# Patient Record
Sex: FEMALE | Race: WHITE | NOT HISPANIC OR LATINO | Employment: UNEMPLOYED | URBAN - METROPOLITAN AREA
[De-identification: names, ages, dates, MRNs, and addresses within clinical notes are randomized per-mention and may not be internally consistent; named-entity substitution may affect disease eponyms.]

---

## 2017-01-09 ENCOUNTER — PATIENT MESSAGE (OUTPATIENT)
Dept: NEUROLOGY | Facility: CLINIC | Age: 32
End: 2017-01-09

## 2017-04-04 ENCOUNTER — OFFICE VISIT (OUTPATIENT)
Dept: NEUROLOGY | Facility: CLINIC | Age: 32
End: 2017-04-04
Payer: COMMERCIAL

## 2017-04-04 VITALS
DIASTOLIC BLOOD PRESSURE: 70 MMHG | HEART RATE: 80 BPM | HEIGHT: 61 IN | RESPIRATION RATE: 16 BRPM | WEIGHT: 172.19 LBS | SYSTOLIC BLOOD PRESSURE: 108 MMHG | BODY MASS INDEX: 32.51 KG/M2

## 2017-04-04 DIAGNOSIS — G93.2 PSEUDOTUMOR CEREBRI: Primary | ICD-10-CM

## 2017-04-04 DIAGNOSIS — E66.9 OBESITY (BMI 30-39.9): ICD-10-CM

## 2017-04-04 DIAGNOSIS — G89.29 CHRONIC TMJ PAIN: ICD-10-CM

## 2017-04-04 DIAGNOSIS — M26.629 CHRONIC TMJ PAIN: ICD-10-CM

## 2017-04-04 PROCEDURE — 99214 OFFICE O/P EST MOD 30 MIN: CPT | Mod: S$GLB,,, | Performed by: PSYCHIATRY & NEUROLOGY

## 2017-04-04 PROCEDURE — 1160F RVW MEDS BY RX/DR IN RCRD: CPT | Mod: S$GLB,,, | Performed by: PSYCHIATRY & NEUROLOGY

## 2017-04-04 PROCEDURE — 99999 PR PBB SHADOW E&M-EST. PATIENT-LVL II: CPT | Mod: PBBFAC,,, | Performed by: PSYCHIATRY & NEUROLOGY

## 2017-04-04 RX ORDER — ESOMEPRAZOLE MAGNESIUM 40 MG/1
40 CAPSULE, DELAYED RELEASE ORAL DAILY
Refills: 5 | COMMUNITY
Start: 2017-03-20

## 2017-04-04 NOTE — PROGRESS NOTES
HPI: Dina Duran is a 31 y.o. female with pseudotumor cerebri diagnosed in 7/2016 after visual exam (due to blurred vision) and LP results.   Her eye MD sent records from 2/2017- papilledema is improved  Rare if any visual changes. No continued blurred vision  No consistent headaches. Some sinus pressure  She has lost 5 pounds since the last visit.   She saw an ENT about ear and jaw pain. She is having this pain in the ear and her right jaw with some radiation down. She has some days without much pain and others with some pain. She has been to the dentist and was not thought to have bruxism. She was recommended to see oral surgeon    Review of Systems   Constitutional: Negative for fever.   Eyes: Negative for blurred vision and double vision.   Respiratory: Negative for hemoptysis.    Cardiovascular: Negative for leg swelling.   Gastrointestinal: Negative for blood in stool.   Genitourinary: Negative for hematuria.   Musculoskeletal: Negative for falls.   Skin: Negative for rash.   Neurological: Negative for seizures and headaches.   Psychiatric/Behavioral: The patient does not have insomnia.          I have reviewed all of this patient's past medical and surgical histories as well as family and social histories and active allergies and medications as documented in the electronic medical record.            Exam:  Gen Appearance, well developed/nourished in no apparent distress  CV: 2+ distal pulses with no edema or swelling  Neuro:  MS: Awake, alert, . Sustains attention. Recent/remote memory intact, Language is full to spontaneous speech/comprehension. Fund of Knowledge is full  CN: Optic discs are normal  without venous pulsations today, PERRL, Extraoccular movements and visual fields are full. Normal facial sensation and strength, Hearing symmetric, Tongue and Palate are midline and strong. Shoulder Shrug symmetric and strong.  Motor: Normal bulk, tone, no abnormal movements. 5/5 strength bilateral  upper/lower extremities with 2+ reflexes no clonus  Sensory: symmetric  temp, and vibration. Romberg negative  Cerebellar: Finger-nose,Heal-shin, Rapid alternating movements intact  Gait: Normal stance, no ataxia    MRI/A/V brain 2016- unremarkable    Labs: CMP unremarkable/ as expected for diamox use 2016    Assessment/Plan: Dina Duran is a 31 y.o. female with pseudotumor cerebri diagnosed in 7/2016 after visual exam (due to blurred vision) and LP results.     I recommend:     1. Her visual symptoms are resolved and she has no headaches- papilledema  Is improving.   2. She is now tolerating Diamox 250mg BID - continue this dose as papilledema is improving. Opthalmology considering dose reduction if she continues to improve.   3. She will continue opthalmology follow up   4. Reviewed ultimate treatment of pseudotumor cerebri is weight loss-- she continues to reduce obesity  5. Agree that she may have some symptoms of TMJ- see oral surgery for another opinion. Consider .   RTC in 6 months. Notify me sooner for any worsening vision or headaches.

## 2017-04-04 NOTE — MR AVS SNAPSHOT
[facility]  [address] [address]  Phone: [phone]  Fax: [phone]                  Dina Rodrigezescobar   2017 4:30 PM   Office Visit    Description:  Female : 1985   Provider:  Cody Rivera MD   Department:  [facility]           Reason for Visit     Neurologic Problem           Diagnoses this Visit        Comments    Pseudotumor cerebri    -  Primary     Chronic TMJ pain         Obesity (BMI 30-39.9)                To Do List           Goals (5 Years of Data)     None      Follow-Up and Disposition     Return in about 6 months (around 10/4/2017).      [facility]     [facility] On [facility] Nurse Care Line -  Assistance  Unless otherwise directed by your provider, please contact [facility], our nurse care line that is available for  assistance.     Registered nurses in the [facility] provide: appointment scheduling, clinical advisement, health education, and other advisory services.  Call: [phone] (toll free)               Medications           Message regarding Medications     Verify the changes and/or additions to your medication regime listed below are the same as discussed with your clinician today.  If any of these changes or additions are incorrect, please notify your healthcare provider.             Verify that the below list of medications is an accurate representation of the medications you are currently taking.  If none reported, the list may be blank. If incorrect, please contact your healthcare provider. Carry this list with you in case of emergency.           Current Medications     acetaZOLAMIDE (DIAMOX) 250 MG tablet Take 1 tablet (250 mg total) by mouth 2 (two) times daily.    cetirizine (ZYRTEC) 10 MG tablet Take 10 mg by mouth once daily.    esomeprazole (NEXIUM) 40 MG capsule TK 1 C PO QD    metformin (GLUCOPHAGE) 500 MG tablet Take 1 tablet by mouth 2 (two) times daily.          "  Clinical Reference Information           Your Vitals Were     BP Pulse Resp Height Weight BMI    108/70 (BP Location: Right arm, Patient Position: Sitting, BP Method: Manual) 80 16 5' 1" (1.549 m) 78.1 kg (172 lb 2.9 oz) 32.53 kg/m2      Blood Pressure          Most Recent Value    BP  108/70      Allergies as of 4/4/2017     Pcn [Penicillins]    Prilosec [Omeprazole Magnesium]      Immunizations Administered on Date of Encounter - 4/4/2017     None      Language Assistance Services     ATTENTION: Language assistance services are available, free of charge. Please call [phone].      ATENCIÓN: Si habla español, tiene a russ disposición servicios gratuitos de asistencia lingüística. Llame al [phone].     CHÚ Ý: N?u b?n nói Ti?ng Vi?t, có các d?ch v? h? tr? ngôn ng? mi?n phí dành cho b?n. G?i s? [phone].         [facility] complies with applicable Federal civil rights laws and does not discriminate on the basis of race, color, national origin, age, disability, or sex.        "

## 2017-06-07 ENCOUNTER — PATIENT MESSAGE (OUTPATIENT)
Dept: NEUROLOGY | Facility: CLINIC | Age: 32
End: 2017-06-07

## 2017-10-02 ENCOUNTER — OFFICE VISIT (OUTPATIENT)
Dept: NEUROLOGY | Facility: CLINIC | Age: 32
End: 2017-10-02
Payer: COMMERCIAL

## 2017-10-02 VITALS
HEART RATE: 86 BPM | DIASTOLIC BLOOD PRESSURE: 66 MMHG | WEIGHT: 170.44 LBS | SYSTOLIC BLOOD PRESSURE: 98 MMHG | HEIGHT: 61 IN | BODY MASS INDEX: 32.18 KG/M2 | RESPIRATION RATE: 14 BRPM

## 2017-10-02 DIAGNOSIS — L29.9 ITCHING: ICD-10-CM

## 2017-10-02 DIAGNOSIS — G93.2 PSEUDOTUMOR CEREBRI: Primary | ICD-10-CM

## 2017-10-02 DIAGNOSIS — E66.9 OBESITY (BMI 30-39.9): ICD-10-CM

## 2017-10-02 PROCEDURE — 99214 OFFICE O/P EST MOD 30 MIN: CPT | Mod: S$GLB,,, | Performed by: PSYCHIATRY & NEUROLOGY

## 2017-10-02 PROCEDURE — 3008F BODY MASS INDEX DOCD: CPT | Mod: S$GLB,,, | Performed by: PSYCHIATRY & NEUROLOGY

## 2017-10-02 PROCEDURE — 99999 PR PBB SHADOW E&M-EST. PATIENT-LVL II: CPT | Mod: PBBFAC,,, | Performed by: PSYCHIATRY & NEUROLOGY

## 2017-10-02 RX ORDER — CHOLECALCIFEROL (VITAMIN D3) 25 MCG
1000 TABLET ORAL DAILY
COMMUNITY

## 2017-10-02 RX ORDER — ACETAZOLAMIDE 250 MG/1
250 TABLET ORAL 2 TIMES DAILY
Qty: 60 TABLET | Refills: 11 | Status: SHIPPED | OUTPATIENT
Start: 2017-10-02 | End: 2018-10-16 | Stop reason: SDUPTHER

## 2017-10-02 RX ORDER — VITAMIN E 268 MG
400 CAPSULE ORAL 2 TIMES DAILY
COMMUNITY

## 2017-10-02 NOTE — PROGRESS NOTES
HPI:  Dina Duran is a 32 y.o. female with pseudotumor cerebri diagnosed in 7/2016 after visual exam (due to blurred vision) and LP results.  Patient did e-mail me in June with 2 weeks of headaches- reduction of analgesic overuse suggested. This did resolve  Her opthalmologist lowered her diamox dose in July due to improved exam to 250mg daily. She has follow up with this doc in December. She is having a  increased headaches since then. She can have 2-4 headaches per week which are not severe and she can treat with rest or OTC symptoms. No visual symptoms as prior. She has been having more forgetfulness lately, over the past month with her daily tasks. Functioning well, working, taking care of her family. Just short term difficulty with memory.   TMJ symptoms are improved with changes in diet and reducing gum chewing.   She reports some redness and itching/dryness of the lips over the past 2 weeks- started with numbness. No changes in diet or lipstick or topical agents.   Saw PCP for fatigue recently- had labs.   She is still loosing weight.    Review of Systems   Constitutional: Negative for fever.   HENT: Negative for nosebleeds.    Eyes: Negative for blurred vision and double vision.        But sometimes seeing shadows.    Respiratory: Negative for hemoptysis.    Cardiovascular: Negative for leg swelling.   Gastrointestinal: Negative for blood in stool.   Genitourinary: Negative for hematuria.   Musculoskeletal: Negative for falls.   Skin: Negative for rash.   Neurological: Positive for headaches. Negative for sensory change.   Psychiatric/Behavioral: The patient has insomnia.          I have reviewed all of this patient's past medical and surgical histories as well as family and social histories and active allergies and medications as documented in the electronic medical record.      Exam:  Gen Appearance, well developed/nourished in no apparent distress  CV: 2+ distal pulses with no edema or  swelling  Neuro:  MS: Awake, alert, . Sustains attention. Recent/remote memory intact, Language is full to spontaneous speech/comprehension. Fund of Knowledge is full  CN: Optic discs are normal  without venous pulsations today, PERRL, Extraoccular movements and visual fields are full. Normal facial sensation and strength, Hearing symmetric, Tongue and Palate are midline and strong. Shoulder Shrug symmetric and strong.  Motor: Normal bulk, tone, no abnormal movements. 5/5 strength bilateral upper/lower extremities with 2+ reflexes no clonus  Sensory: symmetric  temp, and vibration. Romberg negative  Cerebellar: Finger-nose, Rapid alternating movements intact  Gait: Normal stance, no ataxia    MRI/A/V brain 2016- unremarkable    Labs: CMP unremarkable/ as expected for diamox use 2016    Assessment/Plan: Dina Duran is a 32 y.o. female with pseudotumor cerebri diagnosed in 7/2016 after visual exam (due to blurred vision) and LP results.     I recommend:     1. Concerning is her increased cluster of symptoms including more frequent headaches (fatigue, memory challenges) with lowering of Diamox recently by ophthalmologist. Raise dose back to  Diamox 250mg BID unless side effects.   2. Will monitor CMP at next visit  3. Reviewed ultimate treatment of pseudotumor cerebri is weight loss-- she continues to reduce obesity  4. Use OTC cortisone cream BID for a week for itching lips. However, numbness if present could be related to pseudotumor. See PCP or derm if not better soon.   RTC in 3 months

## 2017-10-19 ENCOUNTER — PATIENT MESSAGE (OUTPATIENT)
Dept: NEUROLOGY | Facility: CLINIC | Age: 32
End: 2017-10-19

## 2017-10-19 RX ORDER — BUTALBITAL, ACETAMINOPHEN AND CAFFEINE 50; 325; 40 MG/1; MG/1; MG/1
TABLET ORAL
Qty: 20 TABLET | Refills: 1 | Status: SHIPPED | OUTPATIENT
Start: 2017-10-19 | End: 2019-07-08

## 2017-10-29 ENCOUNTER — PATIENT MESSAGE (OUTPATIENT)
Dept: NEUROLOGY | Facility: CLINIC | Age: 32
End: 2017-10-29

## 2017-11-30 ENCOUNTER — PATIENT MESSAGE (OUTPATIENT)
Dept: NEUROLOGY | Facility: CLINIC | Age: 32
End: 2017-11-30

## 2017-12-09 ENCOUNTER — PATIENT MESSAGE (OUTPATIENT)
Dept: NEUROLOGY | Facility: CLINIC | Age: 32
End: 2017-12-09

## 2017-12-11 DIAGNOSIS — G93.2 PSEUDOTUMOR CEREBRI: Primary | ICD-10-CM

## 2017-12-14 ENCOUNTER — PATIENT MESSAGE (OUTPATIENT)
Dept: NEUROLOGY | Facility: CLINIC | Age: 32
End: 2017-12-14

## 2018-02-14 ENCOUNTER — LAB VISIT (OUTPATIENT)
Dept: LAB | Facility: HOSPITAL | Age: 33
End: 2018-02-14
Attending: NURSE PRACTITIONER
Payer: COMMERCIAL

## 2018-02-14 ENCOUNTER — OFFICE VISIT (OUTPATIENT)
Dept: NEUROLOGY | Facility: CLINIC | Age: 33
End: 2018-02-14
Payer: COMMERCIAL

## 2018-02-14 VITALS
RESPIRATION RATE: 16 BRPM | WEIGHT: 168.44 LBS | HEIGHT: 61 IN | SYSTOLIC BLOOD PRESSURE: 100 MMHG | HEART RATE: 80 BPM | BODY MASS INDEX: 31.8 KG/M2 | DIASTOLIC BLOOD PRESSURE: 76 MMHG

## 2018-02-14 DIAGNOSIS — G93.2 PSEUDOTUMOR CEREBRI: ICD-10-CM

## 2018-02-14 DIAGNOSIS — R20.0 NUMBNESS OF TOES: ICD-10-CM

## 2018-02-14 DIAGNOSIS — R41.89 COGNITIVE IMPAIRMENT: ICD-10-CM

## 2018-02-14 DIAGNOSIS — G43.101 MIGRAINE WITH AURA AND WITH STATUS MIGRAINOSUS, NOT INTRACTABLE: Primary | ICD-10-CM

## 2018-02-14 DIAGNOSIS — F41.9 ANXIETY: ICD-10-CM

## 2018-02-14 PROBLEM — G43.109 MIGRAINE WITH AURA: Status: ACTIVE | Noted: 2018-02-14

## 2018-02-14 LAB
ALBUMIN SERPL BCP-MCNC: 3.8 G/DL
ALP SERPL-CCNC: 44 U/L
ALT SERPL W/O P-5'-P-CCNC: 11 U/L
ANION GAP SERPL CALC-SCNC: 7 MMOL/L
AST SERPL-CCNC: 12 U/L
BILIRUB SERPL-MCNC: 0.5 MG/DL
BUN SERPL-MCNC: 18 MG/DL
CALCIUM SERPL-MCNC: 8.9 MG/DL
CHLORIDE SERPL-SCNC: 111 MMOL/L
CO2 SERPL-SCNC: 22 MMOL/L
CREAT SERPL-MCNC: 0.7 MG/DL
EST. GFR  (AFRICAN AMERICAN): >60 ML/MIN/1.73 M^2
EST. GFR  (NON AFRICAN AMERICAN): >60 ML/MIN/1.73 M^2
GLUCOSE SERPL-MCNC: 85 MG/DL
POTASSIUM SERPL-SCNC: 3.8 MMOL/L
PROT SERPL-MCNC: 6.7 G/DL
SODIUM SERPL-SCNC: 140 MMOL/L

## 2018-02-14 PROCEDURE — 36415 COLL VENOUS BLD VENIPUNCTURE: CPT

## 2018-02-14 PROCEDURE — 99999 PR PBB SHADOW E&M-EST. PATIENT-LVL IV: CPT | Mod: PBBFAC,,, | Performed by: NURSE PRACTITIONER

## 2018-02-14 PROCEDURE — 80053 COMPREHEN METABOLIC PANEL: CPT

## 2018-02-14 PROCEDURE — 99214 OFFICE O/P EST MOD 30 MIN: CPT | Mod: S$GLB,,, | Performed by: NURSE PRACTITIONER

## 2018-02-14 PROCEDURE — 3008F BODY MASS INDEX DOCD: CPT | Mod: S$GLB,,, | Performed by: NURSE PRACTITIONER

## 2018-02-14 RX ORDER — INDOMETHACIN 50 MG/1
50 CAPSULE ORAL 2 TIMES DAILY PRN
Qty: 30 CAPSULE | Refills: 0 | Status: SHIPPED | OUTPATIENT
Start: 2018-02-14 | End: 2018-03-01 | Stop reason: SDUPTHER

## 2018-02-14 RX ORDER — ZONISAMIDE 25 MG/1
25 CAPSULE ORAL NIGHTLY
Qty: 30 CAPSULE | Refills: 11 | Status: SHIPPED | OUTPATIENT
Start: 2018-02-14 | End: 2018-02-14 | Stop reason: SDUPTHER

## 2018-02-14 RX ORDER — ACETAMINOPHEN 500 MG
1000 TABLET ORAL EVERY 6 HOURS PRN
COMMUNITY

## 2018-02-14 RX ORDER — ZONISAMIDE 25 MG/1
25 CAPSULE ORAL NIGHTLY
Qty: 120 CAPSULE | Refills: 11 | Status: SHIPPED | OUTPATIENT
Start: 2018-02-14 | End: 2018-06-05

## 2018-02-14 RX ORDER — IBUPROFEN 200 MG
600 TABLET ORAL EVERY 6 HOURS PRN
COMMUNITY

## 2018-02-15 ENCOUNTER — PATIENT MESSAGE (OUTPATIENT)
Dept: NEUROLOGY | Facility: CLINIC | Age: 33
End: 2018-02-15

## 2018-03-01 DIAGNOSIS — G43.101 MIGRAINE WITH AURA AND WITH STATUS MIGRAINOSUS, NOT INTRACTABLE: ICD-10-CM

## 2018-03-01 RX ORDER — INDOMETHACIN 50 MG/1
CAPSULE ORAL
Qty: 30 CAPSULE | Refills: 0 | Status: SHIPPED | OUTPATIENT
Start: 2018-03-01 | End: 2018-06-05

## 2018-06-05 ENCOUNTER — OFFICE VISIT (OUTPATIENT)
Dept: NEUROLOGY | Facility: CLINIC | Age: 33
End: 2018-06-05
Payer: COMMERCIAL

## 2018-06-05 VITALS
WEIGHT: 173.5 LBS | RESPIRATION RATE: 16 BRPM | HEART RATE: 78 BPM | DIASTOLIC BLOOD PRESSURE: 68 MMHG | SYSTOLIC BLOOD PRESSURE: 104 MMHG | BODY MASS INDEX: 32.76 KG/M2 | HEIGHT: 61 IN

## 2018-06-05 DIAGNOSIS — G93.2 PSEUDOTUMOR CEREBRI: ICD-10-CM

## 2018-06-05 DIAGNOSIS — E66.9 OBESITY (BMI 30-39.9): ICD-10-CM

## 2018-06-05 DIAGNOSIS — G43.101 MIGRAINE WITH AURA AND WITH STATUS MIGRAINOSUS, NOT INTRACTABLE: Primary | ICD-10-CM

## 2018-06-05 PROCEDURE — 99999 PR PBB SHADOW E&M-EST. PATIENT-LVL III: CPT | Mod: PBBFAC,,, | Performed by: PSYCHIATRY & NEUROLOGY

## 2018-06-05 PROCEDURE — 99214 OFFICE O/P EST MOD 30 MIN: CPT | Mod: S$GLB,,, | Performed by: PSYCHIATRY & NEUROLOGY

## 2018-06-05 PROCEDURE — 3008F BODY MASS INDEX DOCD: CPT | Mod: CPTII,S$GLB,, | Performed by: PSYCHIATRY & NEUROLOGY

## 2018-06-05 NOTE — PROGRESS NOTES
HPI: Dina Duran is a 32 y.o. female with pseudotumor cerebri diagnosed in 7/2016 after visual exam (due to blurred vision) and LP results.     Since the last visit with me, patient saw NP, Grace due to increased headaches  She also complained of some toe numbness  Patient also had less papilledema by eye exam late last year  Zonegran was never needed.  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   No more dizziness spells since this.  She only has headache with menses now  She no longer has tingling  She has gained 5 pounds since the last visit.   Currently taking diamox 250mg at 1/2 int he am and 1 at night.   Her last eye exam this year showed return to normal- she was discharged for 6 months.     Review of Systems   Constitutional: Negative for fever.   HENT: Negative for nosebleeds.    Eyes: Negative for blurred vision and double vision.            Respiratory: Negative for hemoptysis.    Cardiovascular: Negative for leg swelling.   Gastrointestinal: Negative for blood in stool.   Genitourinary: Negative for hematuria.   Musculoskeletal: Negative for falls.   Skin: Negative for rash.   Neurological: Positive for headaches.   Psychiatric/Behavioral: The patient does not have insomnia.          I have reviewed all of this patient's past medical and surgical histories as well as family and social histories and active allergies and medications as documented in the electronic medical record.      Exam:  Gen Appearance, well developed/nourished in no apparent distress  CV: 2+ distal pulses with no edema or swelling  Neuro:  MS: Awake, alert, . Sustains attention. Recent/remote memory intact, Language is full to spontaneous speech/comprehension. Fund of Knowledge is full  CN: Optic discs are normal  without venous pulsations today, PERRL, Extraoccular movements and visual fields are full. Normal facial sensation and  strength, Hearing symmetric, Tongue and Palate are midline and strong. Shoulder Shrug symmetric and strong.  Motor: Normal bulk, tone, no abnormal movements. 5/5 strength bilateral upper/lower extremities with 2+ reflexes no clonus  Sensory: symmetric  temp, and vibration. Romberg negative  Cerebellar: Finger-nose, Rapid alternating movements intact  Gait: Normal stance, no ataxia    MRI/A/V brain 2016- unremarkable    Labs: CMP  2/2018 reviewed    Assessment/Plan: Dina Duran is a 32 y.o. female with pseudotumor cerebri diagnosed in 7/2016 after visual exam (due to blurred vision) and LP results.     I recommend:   Grace:   1. Zonegran not needed as she is improved greatly with headache since removing the stress of her job.   2. Check CMP  3. Continue Diamox at 125mg in the am and 250mg at night  4. Reviewed ultimate treatment of pseudotumor cerebri is weight loss/reduction of obesity-- she will return to diet and exercise  5. Can use Fioricet PRN vs Indocin PRN headce     RTC 6 months

## 2018-09-17 ENCOUNTER — OFFICE VISIT (OUTPATIENT)
Dept: NEUROLOGY | Facility: CLINIC | Age: 33
End: 2018-09-17
Payer: COMMERCIAL

## 2018-09-17 VITALS
HEART RATE: 86 BPM | SYSTOLIC BLOOD PRESSURE: 100 MMHG | HEIGHT: 61 IN | RESPIRATION RATE: 14 BRPM | WEIGHT: 178.38 LBS | BODY MASS INDEX: 33.68 KG/M2 | DIASTOLIC BLOOD PRESSURE: 86 MMHG

## 2018-09-17 DIAGNOSIS — G43.101 MIGRAINE WITH AURA AND WITH STATUS MIGRAINOSUS, NOT INTRACTABLE: ICD-10-CM

## 2018-09-17 DIAGNOSIS — G93.2 PSEUDOTUMOR CEREBRI: Primary | ICD-10-CM

## 2018-09-17 DIAGNOSIS — E66.9 OBESITY (BMI 30-39.9): ICD-10-CM

## 2018-09-17 PROCEDURE — 99999 PR PBB SHADOW E&M-EST. PATIENT-LVL III: CPT | Mod: PBBFAC,,, | Performed by: PSYCHIATRY & NEUROLOGY

## 2018-09-17 PROCEDURE — 99214 OFFICE O/P EST MOD 30 MIN: CPT | Mod: S$GLB,,, | Performed by: PSYCHIATRY & NEUROLOGY

## 2018-09-17 PROCEDURE — 3008F BODY MASS INDEX DOCD: CPT | Mod: CPTII,S$GLB,, | Performed by: PSYCHIATRY & NEUROLOGY

## 2018-09-17 RX ORDER — ONDANSETRON 4 MG/1
8 TABLET, FILM COATED ORAL EVERY 8 HOURS PRN
COMMUNITY

## 2018-09-17 NOTE — PROGRESS NOTES
HPI: Dina Duran is a 33 y.o. female with pseudotumor cerebri diagnosed in 7/2016 after visual exam (due to blurred vision) and LP results.     Patient returns sooner than her scheduled visit complaining of worsening symptoms.  She states she has had dizziness, headaches and pupillary dilation on the right once mildly. She states this has occurred a few time briefly over the years. She feels she had pupillary changes when diagnosed 2 years ago.   Symptoms have started over several weeks and feels worse lately.  She has pulsatile tinnitus. She has nausea and headaches. She also complains of dizziness of room movement.   Vision is poor on the right side, she states poor focus. The symptom have been daily.   She saw her eye doc (optometrist) and was told visual acuity was normal and she was told her papilledema was not active in the past 1 month.   Her  is here with her today.     She states she has not taken zonegran- never did take  She is currently taking 125mg of Diamox in the am and 250mg at night.   She is not currently working.   Gained 5 poundsince the last visit    Review of Systems   Constitutional: Negative for fever.   HENT: Positive for tinnitus. Negative for nosebleeds.    Eyes: Positive for blurred vision. Negative for double vision.            Respiratory: Negative for hemoptysis.    Cardiovascular: Negative for leg swelling.   Gastrointestinal: Negative for blood in stool.   Genitourinary: Negative for hematuria.   Musculoskeletal: Negative for falls.   Skin: Negative for rash.   Neurological: Positive for dizziness and headaches. Negative for seizures.   Psychiatric/Behavioral: The patient does not have insomnia.          I have reviewed all of this patient's past medical and surgical histories as well as family and social histories and active allergies and medications as documented in the electronic medical record.      Exam:  Gen Appearance, well developed/nourished in no apparent  distress  CV: 2+ distal pulses with no edema or swelling  Neuro:  MS: Awake, alert, . Sustains attention. Recent/remote memory intact, Language is full to spontaneous speech/comprehension. Fund of Knowledge is full  CN: Optic discs are mildly raised on both sides without venous pulsations  PERRL, Extraoccular movements and visual fields are full. Normal facial sensation and strength, Hearing symmetric, Tongue and Palate are midline and strong. Shoulder Shrug symmetric and strong.  Motor: Normal bulk, tone, no abnormal movements. 5/5 strength bilateral upper/lower extremities with 2+ reflexes no clonus  Sensory: symmetric  temp, and vibration. Romberg negative  Cerebellar: Finger-nose, Rapid alternating movements intact  Gait: Normal stance, no ataxia    MRI/A/V brain 2016- unremarkable    Labs: CMP  2/2018 reviewed    Assessment/Plan: Dina Duran is a 33 y.o. female with pseudotumor cerebri diagnosed in 7/2016 after visual exam (due to blurred vision) and LP results.     I recommend:   1. Patient did note right sided pupillary changes at onset of symptoms 2 years ago and has vague changes at times now. MRI/MRA and MRV were normal at presentation- no further work up needed as long as she improves again as expected  2. Currently, she is having more nausea, headaches, pulsatile tinnitus and dizziness as well in light of mild increase in papilledema today  3. Raise Diamox to 250mg BID (introduce the dose ever other day for 2-3 weeks)  4. CMP at the next visit  5. Zonegran was presribed prior but she was too afraid to take  6. Reviewed ultimate treatment of pseudotumor cerebri is weight loss/reduction of obesity-- she will return to diet and exercise  7. Can use Fioricet PRN vs Indocin PRN headce     RTC as scheduled

## 2018-09-18 ENCOUNTER — PATIENT MESSAGE (OUTPATIENT)
Dept: NEUROLOGY | Facility: CLINIC | Age: 33
End: 2018-09-18

## 2018-10-16 RX ORDER — ACETAZOLAMIDE 250 MG/1
250 TABLET ORAL 2 TIMES DAILY
Qty: 60 TABLET | Refills: 11 | Status: SHIPPED | OUTPATIENT
Start: 2018-10-16 | End: 2019-09-16 | Stop reason: SDUPTHER

## 2018-11-25 ENCOUNTER — PATIENT MESSAGE (OUTPATIENT)
Dept: NEUROLOGY | Facility: CLINIC | Age: 33
End: 2018-11-25

## 2018-12-01 ENCOUNTER — PATIENT MESSAGE (OUTPATIENT)
Dept: NEUROLOGY | Facility: CLINIC | Age: 33
End: 2018-12-01

## 2019-01-07 ENCOUNTER — OFFICE VISIT (OUTPATIENT)
Dept: NEUROLOGY | Facility: CLINIC | Age: 34
End: 2019-01-07
Payer: COMMERCIAL

## 2019-01-07 ENCOUNTER — LAB VISIT (OUTPATIENT)
Dept: LAB | Facility: HOSPITAL | Age: 34
End: 2019-01-07
Attending: PSYCHIATRY & NEUROLOGY
Payer: COMMERCIAL

## 2019-01-07 VITALS
HEART RATE: 88 BPM | SYSTOLIC BLOOD PRESSURE: 96 MMHG | BODY MASS INDEX: 34.55 KG/M2 | HEIGHT: 61 IN | RESPIRATION RATE: 14 BRPM | DIASTOLIC BLOOD PRESSURE: 70 MMHG | WEIGHT: 183 LBS

## 2019-01-07 DIAGNOSIS — G93.2 PSEUDOTUMOR CEREBRI: Primary | ICD-10-CM

## 2019-01-07 DIAGNOSIS — R42 DIZZINESS: ICD-10-CM

## 2019-01-07 DIAGNOSIS — G93.2 PSEUDOTUMOR CEREBRI: ICD-10-CM

## 2019-01-07 DIAGNOSIS — H57.02 PUPIL ASYMMETRY: ICD-10-CM

## 2019-01-07 LAB
ALBUMIN SERPL BCP-MCNC: 3.6 G/DL
ALP SERPL-CCNC: 49 U/L
ALT SERPL W/O P-5'-P-CCNC: 10 U/L
ANION GAP SERPL CALC-SCNC: 7 MMOL/L
AST SERPL-CCNC: 11 U/L
BILIRUB SERPL-MCNC: 0.4 MG/DL
BUN SERPL-MCNC: 13 MG/DL
CALCIUM SERPL-MCNC: 8.5 MG/DL
CHLORIDE SERPL-SCNC: 112 MMOL/L
CO2 SERPL-SCNC: 22 MMOL/L
CREAT SERPL-MCNC: 0.8 MG/DL
EST. GFR  (AFRICAN AMERICAN): >60 ML/MIN/1.73 M^2
EST. GFR  (NON AFRICAN AMERICAN): >60 ML/MIN/1.73 M^2
GLUCOSE SERPL-MCNC: 86 MG/DL
POTASSIUM SERPL-SCNC: 3.8 MMOL/L
PROT SERPL-MCNC: 6.5 G/DL
SODIUM SERPL-SCNC: 141 MMOL/L

## 2019-01-07 PROCEDURE — 3008F PR BODY MASS INDEX (BMI) DOCUMENTED: ICD-10-PCS | Mod: CPTII,S$GLB,, | Performed by: PSYCHIATRY & NEUROLOGY

## 2019-01-07 PROCEDURE — 99999 PR PBB SHADOW E&M-EST. PATIENT-LVL IV: ICD-10-PCS | Mod: PBBFAC,,, | Performed by: PSYCHIATRY & NEUROLOGY

## 2019-01-07 PROCEDURE — 36415 COLL VENOUS BLD VENIPUNCTURE: CPT

## 2019-01-07 PROCEDURE — 99999 PR PBB SHADOW E&M-EST. PATIENT-LVL IV: CPT | Mod: PBBFAC,,, | Performed by: PSYCHIATRY & NEUROLOGY

## 2019-01-07 PROCEDURE — 80053 COMPREHEN METABOLIC PANEL: CPT

## 2019-01-07 PROCEDURE — 99214 OFFICE O/P EST MOD 30 MIN: CPT | Mod: S$GLB,,, | Performed by: PSYCHIATRY & NEUROLOGY

## 2019-01-07 PROCEDURE — 99214 PR OFFICE/OUTPT VISIT, EST, LEVL IV, 30-39 MIN: ICD-10-PCS | Mod: S$GLB,,, | Performed by: PSYCHIATRY & NEUROLOGY

## 2019-01-07 PROCEDURE — 3008F BODY MASS INDEX DOCD: CPT | Mod: CPTII,S$GLB,, | Performed by: PSYCHIATRY & NEUROLOGY

## 2019-01-07 RX ORDER — PROMETHAZINE HYDROCHLORIDE 25 MG/1
25 TABLET ORAL EVERY 6 HOURS PRN
Refills: 0 | COMMUNITY
Start: 2019-01-02 | End: 2020-07-13

## 2019-01-07 NOTE — PROGRESS NOTES
"HPI:  Dina Duran is a 33 y.o. female with pseudotumor cerebri diagnosed in 7/2016 after visual exam (due to blurred vision) and LP results.   At the last visit, had  more nausea, headaches, pulsatile tinnitus and dizziness as well in light of mild increase in papilledema. Diamox dose was rased to   250mg BID.    Since the last visit, the patient did not see PCP, other symptoms per email cleared up.  No labs were checked since the last visit.  She states she had symptoms of dizziness with heart racing last week. She was told this was "vertigo" and was told to follow up with me. She has had these symptoms prior.   She states she continues to have symptoms of persistent nausea and feels like "my head is disconnected from the rest of my body." She feels these spells in episode with a symptoms that a wave is crashing into her head with making her feel movement.  The heart racing is improved. She has been given phenergan in the ER.  She had 5 days of headaches last week which is also improved. She is only had anxiety after feeling the vertigo/ with the heart racing. Things that trigger the symptoms include driving.   The increase in Diamox at the last visit, her headaches had improved. Her tinnitus is resolved.       Review of Systems   Constitutional: Negative for fever.   HENT: Negative for nosebleeds and tinnitus.    Eyes: Positive for blurred vision.         She continues to have episodes of blurred vision in which she notes her chronic pupillary dilation   Respiratory: Negative for hemoptysis.    Cardiovascular: Negative for leg swelling.   Gastrointestinal: Negative for blood in stool.   Genitourinary: Negative for hematuria.   Musculoskeletal: Negative for falls.   Skin: Negative for rash.   Neurological: Positive for dizziness and headaches. Negative for seizures.   Psychiatric/Behavioral: Negative for hallucinations.         I have reviewed all of this patient's past medical and surgical histories as " well as family and social histories and active allergies and medications as documented in the electronic medical record.      Exam:  Gen Appearance, well developed/nourished in no apparent distress  CV: 2+ distal pulses with no edema or swelling  Neuro:  MS: Awake, alert, . Sustains attention. Recent/remote memory intact, Language is full to spontaneous speech/comprehension. Fund of Knowledge is full  CN: Optic discs are mildly raised on both sides without venous pulsations  PERRL, Extraoccular movements and visual fields are full. Normal facial sensation and strength, Hearing symmetric, Tongue and Palate are midline and strong. Shoulder Shrug symmetric and strong.  Motor: Normal bulk, tone, no abnormal movements. 5/5 strength bilateral upper/lower extremities with 2+ reflexes no clonus  Sensory: symmetric  temp, and vibration. Romberg negative  Cerebellar: Finger-nose, Rapid alternating movements intact  Gait: Normal stance, no ataxia    MRI/A/V brain 2016- unremarkable    Labs: [facility]  2/2018 reviewed    Assessment/Plan: Dina Duran is a 33 y.o. female with pseudotumor cerebri diagnosed in 7/2016 after visual exam (due to blurred vision) and LP results.     I recommend:   1. Patient did note right sided pupillary changes at onset of symptoms 2.5 years ago and has vague changes at times now. MRI/MRA and MRV were normal at presentation. Pupillary exam is normal today  2. At the last visit, had  more nausea, headaches, pulsatile tinnitus and dizziness as well in light of mild increase in papilledema. Diamox dose was rased to   250mg BID and headaches, tinnitus improved. Continue current dose. See PT for vestibular rehab given her persistent dizziness.   3. She has more dizziness with driving and is noting more pupillary changes with visual changes. Repeat CT head. She would be reassured with unremarkable imaging.   4. However, I have no explanation for her pupillary changes reported in terms of pseudotumor  cerebri. This may be more migraine related. Will ask for another opinion from neurophthalmology .   5. Check CMP today.   6. Ananya was presribed prior for headaches but she was too afraid to take this  7. Reviewed ultimate treatment of pseudotumor cerebri is weight loss/reduction of obesity-- she will return to diet and exercise  8. Can use Fioricet PRN vs Indocin PRN headahce     RTC 6 months. Patient was asked to notify me if symptoms don't improve with PT. She was also asked to await CT head results.

## 2019-01-08 ENCOUNTER — PATIENT MESSAGE (OUTPATIENT)
Dept: NEUROLOGY | Facility: CLINIC | Age: 34
End: 2019-01-08

## 2019-01-10 ENCOUNTER — PATIENT MESSAGE (OUTPATIENT)
Dept: NEUROLOGY | Facility: CLINIC | Age: 34
End: 2019-01-10

## 2019-01-11 ENCOUNTER — PATIENT MESSAGE (OUTPATIENT)
Dept: NEUROLOGY | Facility: CLINIC | Age: 34
End: 2019-01-11

## 2019-01-11 ENCOUNTER — HOSPITAL ENCOUNTER (OUTPATIENT)
Dept: RADIOLOGY | Facility: HOSPITAL | Age: 34
Discharge: HOME | End: 2019-01-11
Attending: PSYCHIATRY & NEUROLOGY
Payer: COMMERCIAL

## 2019-01-11 DIAGNOSIS — R42 DIZZINESS: ICD-10-CM

## 2019-01-11 PROCEDURE — 70450 CT HEAD/BRAIN W/O DYE: CPT | Mod: 26,,, | Performed by: RADIOLOGY

## 2019-01-11 PROCEDURE — 70450 CT HEAD WITHOUT CONTRAST: ICD-10-PCS | Mod: 26,,, | Performed by: RADIOLOGY

## 2019-01-11 PROCEDURE — 70450 CT HEAD/BRAIN W/O DYE: CPT | Mod: TC

## 2019-01-16 ENCOUNTER — PATIENT MESSAGE (OUTPATIENT)
Dept: NEUROLOGY | Facility: CLINIC | Age: 34
End: 2019-01-16

## 2019-01-22 ENCOUNTER — CLINICAL SUPPORT (OUTPATIENT)
Dept: OPHTHALMOLOGY | Facility: CLINIC | Age: 34
End: 2019-01-22
Payer: COMMERCIAL

## 2019-01-22 ENCOUNTER — INITIAL CONSULT (OUTPATIENT)
Dept: OPHTHALMOLOGY | Facility: CLINIC | Age: 34
End: 2019-01-22
Payer: COMMERCIAL

## 2019-01-22 DIAGNOSIS — G93.2 IIH (IDIOPATHIC INTRACRANIAL HYPERTENSION): ICD-10-CM

## 2019-01-22 PROCEDURE — 99999 PR PBB SHADOW E&M-EST. PATIENT-LVL III: CPT | Mod: PBBFAC,,, | Performed by: OPHTHALMOLOGY

## 2019-01-22 PROCEDURE — 99999 PR PBB SHADOW E&M-EST. PATIENT-LVL I: ICD-10-PCS | Mod: PBBFAC,,,

## 2019-01-22 PROCEDURE — 92083 EXTENDED VISUAL FIELD XM: CPT | Mod: S$GLB,,, | Performed by: OPHTHALMOLOGY

## 2019-01-22 PROCEDURE — 92004 COMPRE OPH EXAM NEW PT 1/>: CPT | Mod: S$GLB,,, | Performed by: OPHTHALMOLOGY

## 2019-01-22 PROCEDURE — 92083 HUMPHREY VISUAL FIELD - OU - BOTH EYES: ICD-10-PCS | Mod: S$GLB,,, | Performed by: OPHTHALMOLOGY

## 2019-01-22 PROCEDURE — 99999 PR PBB SHADOW E&M-EST. PATIENT-LVL III: ICD-10-PCS | Mod: PBBFAC,,, | Performed by: OPHTHALMOLOGY

## 2019-01-22 PROCEDURE — 92004 PR EYE EXAM, NEW PATIENT,COMPREHESV: ICD-10-PCS | Mod: S$GLB,,, | Performed by: OPHTHALMOLOGY

## 2019-01-22 PROCEDURE — 99999 PR PBB SHADOW E&M-EST. PATIENT-LVL I: CPT | Mod: PBBFAC,,,

## 2019-01-22 NOTE — LETTER
[facility]  [address]  [facility] [address]  Phone: [phone]  Fax: [phone]   January 22, 2019    Cody Rivera MD  [address] [facility] [address]  [facility] [address]    Patient: Dina Duran   MR Number: [id number]   YOB: 1985   Date of Visit: 1/22/2019       Dear Dr. Rivera:    Thank you for referring Dina Duran to me for evaluation. Here is my assessment and plan of care:    Assessment:   /Plan     For exam results, see Encounter Report.    IIH (idiopathic intracranial hypertension)  -     Sadler Visual Field - OU - Extended - Both Eyes      Ms. Rothman visual function is normal. I found no papilledema that would indicate elevated intracranial pressure.. She will continue her current dosage of acetazolamide. She will return to Dr. Rivera for continued care and to me as requested.          Plan:       For exam results, see Encounter Report.    IIH (idiopathic intracranial hypertension)  -     Sadler Visual Field - OU - Extended - Both Eyes      Ms. Rothman visual function is normal. I found no papilledema that would indicate elevated intracranial pressure.. She will continue her current dosage of acetazolamide. She will return to Dr. Rivera for continued care and to me as requested.            Below you will find my full exam findings. If you have questions, please do not hesitate to call me. I look forward to following Ms. Dina Duran along with you.    Sincerely,          Pascual Alcala MD       CC  No Recipients             Base Eye Exam     Visual Acuity (Snellen - Linear)       Right Left    Dist cc 20/20 20/20    Correction:  Glasses          Tonometry (Applanation, 2:35 PM)       Right Left    Pressure 13 13          Pupils       Dark Light    Right 5 4    Left 4.5 3.5          Visual Fields    See HVF Report           Extraocular Movement       Right Left     Full, Ortho Full, Ortho          Neuro/Psych     Oriented  x3:  Yes    Mood/Affect:  Normal          Dilation     Both eyes:  1% Mydriacyl, 2.5% Phenylephrine @ 2:44 PM            Slit Lamp and Fundus Exam     External Exam       Right Left    External Normal Normal          Slit Lamp Exam       Right Left    Lids/Lashes Normal Normal    Conjunctiva/Sclera White and quiet White and quiet    Cornea Clear Clear    Anterior Chamber Deep and quiet Deep and quiet    Iris Round and reactive Round and reactive    Lens Clear Clear    Vitreous Normal Normal          Fundus Exam       Right Left    Disc Normal. No edema. Normal. No edema.    C/D Ratio 0.1 0.1    Macula Normal Normal    Vessels Normal Normal    Periphery Normal Normal

## 2019-01-22 NOTE — PROGRESS NOTES
HPI     Referred by Dr.Jamie CLAUDIO Rivera  Pseudotumor cerebri x 2 years.  Patient states episode of dizzy spells. Episode of loss of peripheral   vision x few hours.  Problems w/focusing and notice occasional OD pupil tends to get larger   that OS.  No eye pain.   Review HVF    I have personally interviewed the patient, reviewed the history and   examined the patient and agree with the technician's exam.    Last edited by Pascual Alcala MD on 1/22/2019  2:22 PM. (History)            Assessment /Plan     For exam results, see Encounter Report.    IIH (idiopathic intracranial hypertension)  -     [facility] Visual Field - OU - Extended - Both Eyes      Ms. Odonnell's visual function is normal. I found no papilledema that would indicate elevated intracranial pressure.. She will continue her current dosage of acetazolamide. She will return to Dr. Rivera for continued care and to me as requested.

## 2019-01-22 NOTE — LETTER
January 22, 2019      No Recipients           [facility] - Ophthalmology  [address]  [facility] [address]  Phone: [phone]  Fax: [phone]          Patient: Dina Duran   MR Number: [id number]   YOB: 1985   Date of Visit: 1/22/2019       Dear Dr. Cody Rivera:    Thank you for referring Dina Duran to me for evaluation. Attached you will find relevant portions of my assessment and plan of care.    If you have questions, please do not hesitate to call me. I look forward to following Dina Duran along with you.    Sincerely,    Pascual Alcala MD    Enclosure  CC:  No Recipients    If you would like to receive this communication electronically, please contact [email or web address] or [phone] to request more information on [employer] Link access.    For providers and/or their staff who would like to refer a patient to [facility], please contact us through our one-stop-shop provider referral line, [facility], at [phone].    If you feel you have received this communication in error or would no longer like to receive these types of communications, please e-mail [email or web address]

## 2019-01-29 ENCOUNTER — PATIENT MESSAGE (OUTPATIENT)
Dept: NEUROLOGY | Facility: CLINIC | Age: 34
End: 2019-01-29

## 2019-02-17 ENCOUNTER — PATIENT MESSAGE (OUTPATIENT)
Dept: NEUROLOGY | Facility: CLINIC | Age: 34
End: 2019-02-17

## 2019-02-19 ENCOUNTER — PATIENT MESSAGE (OUTPATIENT)
Dept: NEUROLOGY | Facility: CLINIC | Age: 34
End: 2019-02-19

## 2019-03-07 ENCOUNTER — PATIENT MESSAGE (OUTPATIENT)
Dept: NEUROLOGY | Facility: CLINIC | Age: 34
End: 2019-03-07

## 2019-07-08 ENCOUNTER — OFFICE VISIT (OUTPATIENT)
Dept: NEUROLOGY | Facility: CLINIC | Age: 34
End: 2019-07-08
Payer: COMMERCIAL

## 2019-07-08 VITALS
WEIGHT: 182.75 LBS | DIASTOLIC BLOOD PRESSURE: 80 MMHG | HEART RATE: 72 BPM | RESPIRATION RATE: 14 BRPM | SYSTOLIC BLOOD PRESSURE: 102 MMHG | HEIGHT: 61 IN | BODY MASS INDEX: 34.5 KG/M2

## 2019-07-08 DIAGNOSIS — R42 DIZZINESS: ICD-10-CM

## 2019-07-08 DIAGNOSIS — G43.101 MIGRAINE WITH AURA AND WITH STATUS MIGRAINOSUS, NOT INTRACTABLE: ICD-10-CM

## 2019-07-08 DIAGNOSIS — G93.2 PSEUDOTUMOR CEREBRI: Primary | ICD-10-CM

## 2019-07-08 DIAGNOSIS — E66.9 OBESITY (BMI 30-39.9): ICD-10-CM

## 2019-07-08 PROCEDURE — 99999 PR PBB SHADOW E&M-EST. PATIENT-LVL III: ICD-10-PCS | Mod: PBBFAC,,, | Performed by: PSYCHIATRY & NEUROLOGY

## 2019-07-08 PROCEDURE — 99214 PR OFFICE/OUTPT VISIT, EST, LEVL IV, 30-39 MIN: ICD-10-PCS | Mod: S$GLB,,, | Performed by: PSYCHIATRY & NEUROLOGY

## 2019-07-08 PROCEDURE — 3008F BODY MASS INDEX DOCD: CPT | Mod: CPTII,S$GLB,, | Performed by: PSYCHIATRY & NEUROLOGY

## 2019-07-08 PROCEDURE — 99999 PR PBB SHADOW E&M-EST. PATIENT-LVL III: CPT | Mod: PBBFAC,,, | Performed by: PSYCHIATRY & NEUROLOGY

## 2019-07-08 PROCEDURE — 3008F PR BODY MASS INDEX (BMI) DOCUMENTED: ICD-10-PCS | Mod: CPTII,S$GLB,, | Performed by: PSYCHIATRY & NEUROLOGY

## 2019-07-08 PROCEDURE — 99214 OFFICE O/P EST MOD 30 MIN: CPT | Mod: S$GLB,,, | Performed by: PSYCHIATRY & NEUROLOGY

## 2019-07-08 NOTE — PROGRESS NOTES
"HPI:  Dina Duran is a 33 y.o. female with pseudotumor cerebri diagnosed in 7/2016 after visual exam (due to blurred vision) and LP results.   At the last visit, had  more nausea, headaches, pulsatile tinnitus and dizziness as well in light of mild increase in papilledema. Diamox dose was rased to   250mg BID.    Since the last visit, the patient saw ophthalmology who stated, "Ms. Odonnell's visual function is normal. I found no papilledema that would indicate elevated intracranial pressure.. She will continue her current dosage of acetazolamide. She will return to Dr. Rivera for continued care and to me as requested."  She has been on 125mg of Diamox in the am and 250mg at night  She was sent to PT for dizziness/ saw audiology. PT did help  She thought of anxiety as a possible trigger for her symptoms.   PT helped as did a lower sodium diet. No more dizziness.  Her vision is good.  No headaches.   Weight is stable  No further pupillary changes. She feels this is improved  She denies anxiety now/ states this is improved.    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     Review of Systems   Constitutional: Negative for fever.   HENT: Negative for nosebleeds and tinnitus.    Eyes: Negative for blurred vision.   Respiratory: Negative for hemoptysis.    Cardiovascular: Negative for leg swelling.   Gastrointestinal: Negative for blood in stool.   Genitourinary: Negative for hematuria.   Musculoskeletal: Negative for falls.   Skin: Negative for rash.   Neurological: Negative for dizziness, seizures and headaches.   Psychiatric/Behavioral: Negative for hallucinations.         I have reviewed all of this patient's past medical and surgical histories as well as family and social histories and active allergies and medications as documented in the electronic medical record.      Exam:  Gen Appearance, well " developed/nourished in no apparent distress  CV: 2+ distal pulses with no edema or swelling  Neuro:  MS: Awake, alert, . Sustains attention. Recent/remote memory intact, Language is full to spontaneous speech/comprehension. Fund of Knowledge is full  CN: Optic discs are normal today  PERRL, Extraoccular movements and visual fields are full. Normal facial sensation and strength, Hearing symmetric, Tongue and Palate are midline and strong. Shoulder Shrug symmetric and strong.  Motor: Normal bulk, tone, no abnormal movements. 5/5 strength bilateral upper/lower extremities with 2+ reflexes no clonus  Sensory: symmetric  temp, and vibration. Romberg negative  Cerebellar: Finger-nose, Rapid alternating movements intact  Gait: Normal stance, no ataxia    MRI/A/V brain 2016- unremarkable    1/2019 CT head: Normal CT brain scan without contrast.      Labs: [facility]  2019 reviewed    Assessment/Plan: Dina Duran is a 33 y.o. female with pseudotumor cerebri diagnosed in 7/2016 after visual exam (due to blurred vision) and LP results.     I recommend:   1. Patient did note right sided pupillary changes at onset of symptoms over 3 years ago and has vague changes at times now. MRI/MRA and MRV were normal at presentation. Pupillary exam is normal  -she was sent to neurophthalmology this year who also found no papilledema in 2019 and this complaint is resolved per patient  -she has continued follow with her local opthalmology  2. At a prior visit, had  more nausea, headaches, pulsatile tinnitus and dizziness as well in light of mild increase in papilledema. She will continue current dose of 125mg in the am and 250mg qhs as  and headaches, tinnitus improved.   -Saw PT for vestibular rehab given her persistent peripheral type dizziness which did help. She can use this again PRN. Audiology placed her on a lower sodium diet.   3. 2019 Repeat head CT for her complaint of s more dizziness with driving and  more pupillary changes with  visual changes was normal.  - I have no explanation for her pupillary changes reported in terms of pseudotumor cerebri. This may be more migraine related. No abnormalities found by neurophthalmology .   5. Check CMP at the next visit  6. Zoneartie was presribed prior for headaches but she was too afraid to take this- not needed at this time  7. Reviewed ultimate treatment of pseudotumor cerebri is weight loss/reduction of obesity-- need for consistent  diet and exercise reviewed  8. [facility]     RTC 6 months

## 2019-09-16 RX ORDER — ACETAZOLAMIDE 250 MG/1
TABLET ORAL
Qty: 45 TABLET | Refills: 3 | Status: SHIPPED | OUTPATIENT
Start: 2019-09-16 | End: 2020-01-20 | Stop reason: SDUPTHER

## 2019-12-06 ENCOUNTER — PATIENT MESSAGE (OUTPATIENT)
Dept: NEUROLOGY | Facility: CLINIC | Age: 34
End: 2019-12-06

## 2019-12-25 ENCOUNTER — PATIENT MESSAGE (OUTPATIENT)
Dept: NEUROLOGY | Facility: CLINIC | Age: 34
End: 2019-12-25

## 2020-01-13 ENCOUNTER — OFFICE VISIT (OUTPATIENT)
Dept: NEUROLOGY | Facility: CLINIC | Age: 35
End: 2020-01-13
Payer: COMMERCIAL

## 2020-01-13 ENCOUNTER — LAB VISIT (OUTPATIENT)
Dept: LAB | Facility: HOSPITAL | Age: 35
End: 2020-01-13
Attending: PSYCHIATRY & NEUROLOGY
Payer: COMMERCIAL

## 2020-01-13 VITALS
DIASTOLIC BLOOD PRESSURE: 84 MMHG | BODY MASS INDEX: 34.3 KG/M2 | RESPIRATION RATE: 14 BRPM | WEIGHT: 181.69 LBS | OXYGEN SATURATION: 99 % | SYSTOLIC BLOOD PRESSURE: 112 MMHG | HEIGHT: 61 IN | HEART RATE: 60 BPM

## 2020-01-13 DIAGNOSIS — G93.2 PSEUDOTUMOR CEREBRI: Primary | ICD-10-CM

## 2020-01-13 DIAGNOSIS — G43.101 MIGRAINE WITH AURA AND WITH STATUS MIGRAINOSUS, NOT INTRACTABLE: ICD-10-CM

## 2020-01-13 DIAGNOSIS — E66.9 OBESITY (BMI 30-39.9): ICD-10-CM

## 2020-01-13 DIAGNOSIS — G93.2 PSEUDOTUMOR CEREBRI: ICD-10-CM

## 2020-01-13 LAB
ALBUMIN SERPL BCP-MCNC: 3.7 G/DL (ref 3.5–5.2)
ALP SERPL-CCNC: 42 U/L (ref 55–135)
ALT SERPL W/O P-5'-P-CCNC: 13 U/L (ref 10–44)
ANION GAP SERPL CALC-SCNC: 7 MMOL/L (ref 8–16)
AST SERPL-CCNC: 12 U/L (ref 10–40)
BILIRUB SERPL-MCNC: 0.3 MG/DL (ref 0.1–1)
BUN SERPL-MCNC: 12 MG/DL (ref 6–20)
CALCIUM SERPL-MCNC: 8.7 MG/DL (ref 8.7–10.5)
CHLORIDE SERPL-SCNC: 111 MMOL/L (ref 95–110)
CO2 SERPL-SCNC: 24 MMOL/L (ref 23–29)
CREAT SERPL-MCNC: 0.8 MG/DL (ref 0.5–1.4)
EST. GFR  (AFRICAN AMERICAN): >60 ML/MIN/1.73 M^2
EST. GFR  (NON AFRICAN AMERICAN): >60 ML/MIN/1.73 M^2
GLUCOSE SERPL-MCNC: 84 MG/DL (ref 70–110)
POTASSIUM SERPL-SCNC: 3.6 MMOL/L (ref 3.5–5.1)
PROT SERPL-MCNC: 6.6 G/DL (ref 6–8.4)
SODIUM SERPL-SCNC: 142 MMOL/L (ref 136–145)

## 2020-01-13 PROCEDURE — 99214 OFFICE O/P EST MOD 30 MIN: CPT | Mod: S$GLB,,, | Performed by: PSYCHIATRY & NEUROLOGY

## 2020-01-13 PROCEDURE — 3008F BODY MASS INDEX DOCD: CPT | Mod: CPTII,S$GLB,, | Performed by: PSYCHIATRY & NEUROLOGY

## 2020-01-13 PROCEDURE — 80053 COMPREHEN METABOLIC PANEL: CPT

## 2020-01-13 PROCEDURE — 99214 PR OFFICE/OUTPT VISIT, EST, LEVL IV, 30-39 MIN: ICD-10-PCS | Mod: S$GLB,,, | Performed by: PSYCHIATRY & NEUROLOGY

## 2020-01-13 PROCEDURE — 36415 COLL VENOUS BLD VENIPUNCTURE: CPT

## 2020-01-13 PROCEDURE — 99999 PR PBB SHADOW E&M-EST. PATIENT-LVL III: ICD-10-PCS | Mod: PBBFAC,,, | Performed by: PSYCHIATRY & NEUROLOGY

## 2020-01-13 PROCEDURE — 3008F PR BODY MASS INDEX (BMI) DOCUMENTED: ICD-10-PCS | Mod: CPTII,S$GLB,, | Performed by: PSYCHIATRY & NEUROLOGY

## 2020-01-13 PROCEDURE — 99999 PR PBB SHADOW E&M-EST. PATIENT-LVL III: CPT | Mod: PBBFAC,,, | Performed by: PSYCHIATRY & NEUROLOGY

## 2020-01-13 RX ORDER — SUMATRIPTAN 50 MG/1
TABLET, FILM COATED ORAL
Qty: 9 TABLET | Refills: 11 | Status: SHIPPED | OUTPATIENT
Start: 2020-01-13

## 2020-01-13 NOTE — PROGRESS NOTES
HPI:  Dina Duran is a 33 y.o. female with pseudotumor cerebri diagnosed in 7/2016 after visual exam (due to blurred vision) and LP results.       Calls and messages from patient reviewed.    Anxiety being treated by another provider.     She reports migraines are episodic currently(a couple of times per month max) and she treats this with OTC meds PRN which are not always effective    She has seen the dentist over some jaw pain that can occur without headaches. She has a prior Dx of TMJ and has a mouth piece. This is treated by OTC meds.     She has recently seen ophthalmologist locally and is all still good        Review of Systems   Constitutional: Negative for fever.   HENT: Negative for nosebleeds and tinnitus.    Eyes: Negative for blurred vision.   Respiratory: Negative for hemoptysis.    Cardiovascular: Negative for leg swelling.   Gastrointestinal: Negative for blood in stool.   Genitourinary: Negative for hematuria.   Musculoskeletal: Negative for falls.   Skin: Negative for rash.   Neurological: Negative for dizziness, seizures and headaches.   Psychiatric/Behavioral: Negative for hallucinations.         I have reviewed all of this patient's past medical and surgical histories as well as family and social histories and active allergies and medications as documented in the electronic medical record.      Exam:  Gen Appearance, well developed/nourished in no apparent distress  CV: 2+ distal pulses with no edema or swelling  Neuro:  MS: Awake, alert, . Sustains attention. Recent/remote memory intact, Language is full to spontaneous speech/comprehension. Fund of Knowledge is full  CN: Optic discs are normal today  PERRL, Extraoccular movements and visual fields are full. Normal facial sensation and strength, Hearing symmetric, Tongue and Palate are midline and strong. Shoulder Shrug symmetric and strong.  Motor: Normal bulk, tone, no abnormal movements. 5/5 strength bilateral upper/lower extremities  with 2+ reflexes no clonus  Sensory: symmetric  temp, and vibration. Romberg negative  Cerebellar: Finger-nose, Rapid alternating movements intact  Gait: Normal stance, no ataxia    MRI/A/V brain 2016- unremarkable    1/2019 CT head: Normal CT brain scan without contrast.      Labs: CMP  2019 reviewed    Assessment/Plan: Dina Duran is a 34 y.o. female with pseudotumor cerebri diagnosed in 7/2016 after visual exam (due to blurred vision) and LP results.     I recommend:   1. Patient did note right sided pupillary changes at onset of symptoms over 3 years ago. MRI/MRA and MRV were normal at presentation. Pupillary exam is normal  -she was sent to neurophthalmology this year who also found no papilledema in 2019 and this complaint is resolved per patient  -This may have been more migraine related  -she has continued follow with her local ophthalmology  2.  She will continue current dose of 125mg in the am and 250mg qhs as  and headaches, tinnitus improved.   -Saw PT for vestibular rehab given her persistent peripheral type dizziness which did help. She can use this again PRN. Audiology placed her on a lower sodium diet.   3. 2019 Repeat head CT for her complaint of  more dizziness with driving and  more pupillary changes with visual changes was normal.  4. Check CMP today  6. Ananya was presribed prior for headaches but she was too afraid to take this- she does not desire prevention medication at this time. She has some jaw pain which may be related to her known TMJ (has a mouth piece for this)  7. Reviewed ultimate treatment of pseudotumor cerebri is weight loss/reduction of obesity-- need for consistent  diet and exercise reviewed  8. Can use Fioricet PRN vs Indocin PRN headache- currently not using this  -Try Imitrex PRN severe episodic headaches. Typical triptan side effects reviewed     RTC 6 months

## 2020-01-18 ENCOUNTER — PATIENT MESSAGE (OUTPATIENT)
Dept: NEUROLOGY | Facility: CLINIC | Age: 35
End: 2020-01-18

## 2020-01-20 RX ORDER — ACETAZOLAMIDE 250 MG/1
TABLET ORAL
Qty: 45 TABLET | Refills: 3 | Status: SHIPPED | OUTPATIENT
Start: 2020-01-20 | End: 2020-08-13 | Stop reason: SDUPTHER

## 2020-03-23 ENCOUNTER — PATIENT MESSAGE (OUTPATIENT)
Dept: NEUROLOGY | Facility: CLINIC | Age: 35
End: 2020-03-23

## 2020-07-13 ENCOUNTER — OFFICE VISIT (OUTPATIENT)
Dept: NEUROLOGY | Facility: CLINIC | Age: 35
End: 2020-07-13
Payer: COMMERCIAL

## 2020-07-13 VITALS
WEIGHT: 190.69 LBS | TEMPERATURE: 98 F | HEIGHT: 61 IN | DIASTOLIC BLOOD PRESSURE: 84 MMHG | OXYGEN SATURATION: 98 % | HEART RATE: 92 BPM | BODY MASS INDEX: 36 KG/M2 | SYSTOLIC BLOOD PRESSURE: 122 MMHG | RESPIRATION RATE: 18 BRPM

## 2020-07-13 DIAGNOSIS — G93.2 PSEUDOTUMOR CEREBRI: Primary | ICD-10-CM

## 2020-07-13 DIAGNOSIS — G43.101 MIGRAINE WITH AURA AND WITH STATUS MIGRAINOSUS, NOT INTRACTABLE: ICD-10-CM

## 2020-07-13 DIAGNOSIS — E66.9 OBESITY (BMI 30-39.9): ICD-10-CM

## 2020-07-13 PROCEDURE — 99999 PR PBB SHADOW E&M-EST. PATIENT-LVL V: CPT | Mod: PBBFAC,,, | Performed by: PSYCHIATRY & NEUROLOGY

## 2020-07-13 PROCEDURE — 3008F BODY MASS INDEX DOCD: CPT | Mod: CPTII,S$GLB,, | Performed by: PSYCHIATRY & NEUROLOGY

## 2020-07-13 PROCEDURE — 3008F PR BODY MASS INDEX (BMI) DOCUMENTED: ICD-10-PCS | Mod: CPTII,S$GLB,, | Performed by: PSYCHIATRY & NEUROLOGY

## 2020-07-13 PROCEDURE — 99214 OFFICE O/P EST MOD 30 MIN: CPT | Mod: S$GLB,,, | Performed by: PSYCHIATRY & NEUROLOGY

## 2020-07-13 PROCEDURE — 99999 PR PBB SHADOW E&M-EST. PATIENT-LVL V: ICD-10-PCS | Mod: PBBFAC,,, | Performed by: PSYCHIATRY & NEUROLOGY

## 2020-07-13 PROCEDURE — 99214 PR OFFICE/OUTPT VISIT, EST, LEVL IV, 30-39 MIN: ICD-10-PCS | Mod: S$GLB,,, | Performed by: PSYCHIATRY & NEUROLOGY

## 2020-07-13 RX ORDER — CYCLOBENZAPRINE HCL 10 MG
10 TABLET ORAL NIGHTLY
COMMUNITY
Start: 2020-05-15 | End: 2022-10-13

## 2020-07-13 RX ORDER — METHYLPREDNISOLONE 4 MG/1
TABLET ORAL
COMMUNITY
Start: 2020-07-09 | End: 2022-01-11

## 2020-07-13 RX ORDER — AZITHROMYCIN 250 MG/1
TABLET, FILM COATED ORAL
COMMUNITY
Start: 2020-07-09 | End: 2022-01-11

## 2020-07-13 RX ORDER — FAMOTIDINE 40 MG/1
40 TABLET, FILM COATED ORAL DAILY
COMMUNITY
Start: 2020-06-17

## 2020-07-13 RX ORDER — NAPROXEN 500 MG/1
TABLET ORAL
COMMUNITY
Start: 2020-05-29 | End: 2022-01-11

## 2020-07-13 RX ORDER — ASCORBIC ACID 500 MG
500 TABLET ORAL DAILY
COMMUNITY
End: 2022-01-11

## 2020-07-13 RX ORDER — CHLORHEXIDINE GLUCONATE ORAL RINSE 1.2 MG/ML
15 SOLUTION DENTAL 2 TIMES DAILY PRN
COMMUNITY
Start: 2020-04-09 | End: 2023-08-31

## 2020-07-13 NOTE — PROGRESS NOTES
HPI:  Dina Duran is a 33 y.o. female with pseudotumor cerebri diagnosed in 7/2016 after visual exam (due to blurred vision) and LP results.     Patient is here for routine follow up    Migraines/ headaches still episodic but greatly improved  Much more headaches  Imitrex not needed to date    Using muscle relaxer for TMJ with good relief now.   Weigh is 9 pounds higher due to diet per her        Review of Systems   Constitutional: Negative for fever.   HENT: Negative for nosebleeds and tinnitus.    Eyes: Negative for blurred vision.   Respiratory: Negative for hemoptysis.    Cardiovascular: Negative for leg swelling.   Gastrointestinal: Negative for blood in stool.   Genitourinary: Negative for hematuria.   Musculoskeletal: Negative for falls.   Skin: Negative for rash.   Neurological: Negative for dizziness, seizures and headaches.   Psychiatric/Behavioral: Negative for hallucinations.         I have reviewed all of this patient's past medical and surgical histories as well as family and social histories and active allergies and medications as documented in the electronic medical record.      Exam:  Gen Appearance, well developed/nourished in no apparent distress  CV: 2+ distal pulses with no edema or swelling  Neuro:  MS: Awake, alert, . Sustains attention. Recent/remote memory intact, Language is full to spontaneous speech/comprehension. Fund of Knowledge is full  CN: Optic discs are normal today  PERRL, Extraoccular movements and visual fields are full. Normal facial sensation and strength, Hearing symmetric, Tongue and Palate are midline and strong. Shoulder Shrug symmetric and strong.  Motor: Normal bulk, tone, no abnormal movements. 5/5 strength bilateral upper/lower extremities with 2+ reflexes no clonus  Sensory: symmetric  temp, and vibration. Romberg negative  Cerebellar: Finger-nose, Rapid alternating movements intact  Gait: Normal stance, no ataxia    MRI/A/V brain 2016- unremarkable    1/2019  CT head: Normal CT brain scan without contrast.      Labs: [facility]  1/2020 reviewed    Assessment/Plan: Dina Duran is a 34 y.o. female with pseudotumor cerebri diagnosed in 7/2016 after visual exam (due to blurred vision) and LP results.     I recommend:   1. Patient reported right sided pupillary changes at onset of symptoms over 3 years ago. MRI/MRA and MRV were normal at presentation. Pupillary exam is normal  -she was sent to neurophthalmology this year who also found no papilledema in 2019 and this complaint is resolved   -This may have been more migraine related  -she has continued follow with her local ophthalmologist for surveillance eye exams  2.  She will continue current dose of Diamox 125mg in the am and 250mg qhs as  and headaches, tinnitus improved on this med  -Previously saw PT for vestibular rehab given her persistent peripheral type dizziness which did help. She can use this again PRN. Audiology placed her on a lower sodium diet as well  - 2019 Repeat head CT for her complaint of  more dizziness with driving and  more pupillary changes with visual changes was normal.  3. Check CMP yearly (due at next visit)  4. Zonegran was presribed prior for headaches but she declined/  she does not desire prevention medication at this time. She has had some jaw pain thought to be  related to her known TMJ (has a mouth piece for this, muscle relaxer)  7. Reviewed ultimate treatment of pseudotumor cerebri is weight loss/reduction of obesity-- need for consistent  diet and exercise reviewed again today. Goal weight loss 10 pounds in a year  8. Can use Fioricet PRN vs Indocin PRN headache- currently not using this  -Imitrex PRN severe episodic headaches. Has not needed to date      RTC 1 year

## 2020-08-13 RX ORDER — ACETAZOLAMIDE 250 MG/1
TABLET ORAL
Qty: 45 TABLET | Refills: 11 | Status: SHIPPED | OUTPATIENT
Start: 2020-08-13 | End: 2021-03-18 | Stop reason: SDUPTHER

## 2020-10-18 ENCOUNTER — PATIENT MESSAGE (OUTPATIENT)
Dept: NEUROLOGY | Facility: CLINIC | Age: 35
End: 2020-10-18

## 2021-03-18 RX ORDER — ACETAZOLAMIDE 250 MG/1
TABLET ORAL
Qty: 135 TABLET | Refills: 1 | Status: SHIPPED | OUTPATIENT
Start: 2021-03-18 | End: 2021-08-09

## 2021-07-27 ENCOUNTER — PATIENT MESSAGE (OUTPATIENT)
Dept: NEUROLOGY | Facility: CLINIC | Age: 36
End: 2021-07-27

## 2021-08-01 ENCOUNTER — PATIENT MESSAGE (OUTPATIENT)
Dept: NEUROLOGY | Facility: CLINIC | Age: 36
End: 2021-08-01

## 2021-08-10 ENCOUNTER — PATIENT MESSAGE (OUTPATIENT)
Dept: NEUROLOGY | Facility: CLINIC | Age: 36
End: 2021-08-10

## 2022-01-11 ENCOUNTER — OFFICE VISIT (OUTPATIENT)
Dept: NEUROLOGY | Facility: CLINIC | Age: 37
End: 2022-01-11
Payer: COMMERCIAL

## 2022-01-11 ENCOUNTER — LAB VISIT (OUTPATIENT)
Dept: LAB | Facility: HOSPITAL | Age: 37
End: 2022-01-11
Attending: PSYCHIATRY & NEUROLOGY
Payer: COMMERCIAL

## 2022-01-11 VITALS
RESPIRATION RATE: 18 BRPM | DIASTOLIC BLOOD PRESSURE: 76 MMHG | BODY MASS INDEX: 37.25 KG/M2 | HEART RATE: 72 BPM | WEIGHT: 197.31 LBS | SYSTOLIC BLOOD PRESSURE: 108 MMHG | HEIGHT: 61 IN

## 2022-01-11 DIAGNOSIS — E66.9 OBESITY (BMI 30-39.9): ICD-10-CM

## 2022-01-11 DIAGNOSIS — G93.2 PSEUDOTUMOR CEREBRI: ICD-10-CM

## 2022-01-11 DIAGNOSIS — G43.101 MIGRAINE WITH AURA AND WITH STATUS MIGRAINOSUS, NOT INTRACTABLE: ICD-10-CM

## 2022-01-11 DIAGNOSIS — G93.2 PSEUDOTUMOR CEREBRI: Primary | ICD-10-CM

## 2022-01-11 LAB
ALBUMIN SERPL BCP-MCNC: 3.7 G/DL (ref 3.5–5.2)
ALP SERPL-CCNC: 57 U/L (ref 55–135)
ALT SERPL W/O P-5'-P-CCNC: 13 U/L (ref 10–44)
ANION GAP SERPL CALC-SCNC: 8 MMOL/L (ref 8–16)
AST SERPL-CCNC: 11 U/L (ref 10–40)
BILIRUB SERPL-MCNC: 0.6 MG/DL (ref 0.1–1)
BUN SERPL-MCNC: 13 MG/DL (ref 6–20)
CALCIUM SERPL-MCNC: 9.1 MG/DL (ref 8.7–10.5)
CHLORIDE SERPL-SCNC: 111 MMOL/L (ref 95–110)
CO2 SERPL-SCNC: 21 MMOL/L (ref 23–29)
CREAT SERPL-MCNC: 0.8 MG/DL (ref 0.5–1.4)
EST. GFR  (AFRICAN AMERICAN): >60 ML/MIN/1.73 M^2
EST. GFR  (NON AFRICAN AMERICAN): >60 ML/MIN/1.73 M^2
GLUCOSE SERPL-MCNC: 87 MG/DL (ref 70–110)
POTASSIUM SERPL-SCNC: 3.7 MMOL/L (ref 3.5–5.1)
PROT SERPL-MCNC: 7 G/DL (ref 6–8.4)
SODIUM SERPL-SCNC: 140 MMOL/L (ref 136–145)

## 2022-01-11 PROCEDURE — 80053 COMPREHEN METABOLIC PANEL: CPT | Performed by: PSYCHIATRY & NEUROLOGY

## 2022-01-11 PROCEDURE — 3008F BODY MASS INDEX DOCD: CPT | Mod: CPTII,S$GLB,, | Performed by: PSYCHIATRY & NEUROLOGY

## 2022-01-11 PROCEDURE — 1159F MED LIST DOCD IN RCRD: CPT | Mod: CPTII,S$GLB,, | Performed by: PSYCHIATRY & NEUROLOGY

## 2022-01-11 PROCEDURE — 3008F PR BODY MASS INDEX (BMI) DOCUMENTED: ICD-10-PCS | Mod: CPTII,S$GLB,, | Performed by: PSYCHIATRY & NEUROLOGY

## 2022-01-11 PROCEDURE — 99214 OFFICE O/P EST MOD 30 MIN: CPT | Mod: S$GLB,,, | Performed by: PSYCHIATRY & NEUROLOGY

## 2022-01-11 PROCEDURE — 99999 PR PBB SHADOW E&M-EST. PATIENT-LVL IV: ICD-10-PCS | Mod: PBBFAC,,, | Performed by: PSYCHIATRY & NEUROLOGY

## 2022-01-11 PROCEDURE — 3078F PR MOST RECENT DIASTOLIC BLOOD PRESSURE < 80 MM HG: ICD-10-PCS | Mod: CPTII,S$GLB,, | Performed by: PSYCHIATRY & NEUROLOGY

## 2022-01-11 PROCEDURE — 3074F SYST BP LT 130 MM HG: CPT | Mod: CPTII,S$GLB,, | Performed by: PSYCHIATRY & NEUROLOGY

## 2022-01-11 PROCEDURE — 99999 PR PBB SHADOW E&M-EST. PATIENT-LVL IV: CPT | Mod: PBBFAC,,, | Performed by: PSYCHIATRY & NEUROLOGY

## 2022-01-11 PROCEDURE — 3074F PR MOST RECENT SYSTOLIC BLOOD PRESSURE < 130 MM HG: ICD-10-PCS | Mod: CPTII,S$GLB,, | Performed by: PSYCHIATRY & NEUROLOGY

## 2022-01-11 PROCEDURE — 3078F DIAST BP <80 MM HG: CPT | Mod: CPTII,S$GLB,, | Performed by: PSYCHIATRY & NEUROLOGY

## 2022-01-11 PROCEDURE — 36415 COLL VENOUS BLD VENIPUNCTURE: CPT | Performed by: PSYCHIATRY & NEUROLOGY

## 2022-01-11 PROCEDURE — 1159F PR MEDICATION LIST DOCUMENTED IN MEDICAL RECORD: ICD-10-PCS | Mod: CPTII,S$GLB,, | Performed by: PSYCHIATRY & NEUROLOGY

## 2022-01-11 PROCEDURE — 99214 PR OFFICE/OUTPT VISIT, EST, LEVL IV, 30-39 MIN: ICD-10-PCS | Mod: S$GLB,,, | Performed by: PSYCHIATRY & NEUROLOGY

## 2022-01-11 NOTE — PROGRESS NOTES
"HPI:  Dina Duran is a 36 y.o. female  with pseudotumor cerebri diagnosed in 7/2016 after visual exam (due to blurred vision) and LP results.     Patient is here for yearly follow up      Had  Covid since the last visit and recovered   has long haul Covid and she has been under stress with this.   Dizziness has been increased again since [place] an amusement park in November. She describes "out of body" feeling. A sense of floating but dizzy  Headaches are very rare.   She had a spell of tingling of the left face lasting a hour without a headache of visual changes without facial drooping. No arm or leg weakness or tingling    One fall/ no injury.     Imitrex never tried to date.     Ophthalmology check ups continue. Reports no papilledema   Weight is up 7 pounds    Review of Systems   Constitutional: Negative for fever.   HENT: Negative for nosebleeds and tinnitus.    Eyes: Negative for blurred vision.   Respiratory: Negative for hemoptysis.    Cardiovascular: Negative for leg swelling.   Gastrointestinal: Negative for blood in stool.   Genitourinary: Negative for hematuria.   Musculoskeletal: Negative for falls.   Skin: Negative for rash.   Neurological: Negative for dizziness, seizures and headaches.   Psychiatric/Behavioral: Negative for hallucinations.         I have reviewed all of this patient's past medical and surgical histories as well as family and social histories and active allergies and medications as documented in the electronic medical record.      Exam:  Gen Appearance, well developed/nourished in no apparent distress  CV: 2+ distal pulses with no edema or swelling  Neuro:  MS: Awake, alert, . Sustains attention. Recent/remote memory intact, Language is full to spontaneous speech/comprehension. Fund of Knowledge is full  CN: Optic discs are normal today with normal vasculature. PERRL, Extraoccular movements and visual fields are full. Normal facial sensation and strength, Hearing " symmetric, Tongue and Palate are midline and strong. Shoulder Shrug symmetric and strong.  Motor: Normal bulk, tone, no abnormal movements. 5/5 strength bilateral upper/lower extremities with 2+ reflexes no clonus  Sensory: symmetric  temp, and vibration. Romberg negative  Cerebellar: Finger-nose, Rapid alternating movements intact  Gait: Normal stance, no ataxia    MRI/A/V brain 2016- unremarkable    1/2019 CT head: Normal CT brain scan without contrast.      Labs: [facility]  1/2020 reviewed    Assessment/Plan: Dina Duran is a 36 y.o. female with pseudotumor cerebri diagnosed in 7/2016 after visual exam (due to blurred vision) and LP results.     I recommend:   1. Patient reported right sided pupillary changes at onset of symptoms prior. . MRI/MRA and MRV were normal at presentation. Pupillary exam is normal  -she was sent to neurophthalmology  who  found no papilledema in 2019 and this complaint is resolved   -This may have been more migraine related  -she has continued follow with her local ophthalmologist for surveillance eye exams  2.  No papilledema found today  -Given her dizziness spells and tingling (without headache), she will try to lower Diamox dose to 250mg nightly (as these could be side effects) only unless more headaches or visual changes or tinnitus  -Previously saw PT for vestibular rehab given her persistent peripheral type dizziness which did help. She can use this again PRN/ maybe reducing diamox with help. Audiology placed her on a lower sodium diet as well  - 2019 Repeat head CT for her complaint of  more dizziness with driving and  more pupillary changes with visual changes was normal.  3. Check CMP yearly (due now)  4. Zonegran was presribed prior for headaches but she declined/  she does not desire prevention medication at this time. She has had some jaw pain thought to be  related to her known TMJ (has a mouth piece for this, muscle relaxer)  7. Reviewed ultimate treatment of  pseudotumor cerebri is weight loss/reduction of obesity-- need for consistent  diet and exercise reviewed again today. Goal weight loss 10 pounds in a year  8. Can use Fioricet PRN vs Indocin PRN headache- currently not using this  -Imitrex PRN severe episodic headaches. Has not needed to date      RTC 1 year

## 2022-04-05 ENCOUNTER — PATIENT MESSAGE (OUTPATIENT)
Dept: NEUROLOGY | Facility: CLINIC | Age: 37
End: 2022-04-05
Payer: COMMERCIAL

## 2022-05-03 ENCOUNTER — PATIENT MESSAGE (OUTPATIENT)
Dept: NEUROLOGY | Facility: CLINIC | Age: 37
End: 2022-05-03
Payer: COMMERCIAL

## 2022-05-03 DIAGNOSIS — G43.101 MIGRAINE WITH AURA AND WITH STATUS MIGRAINOSUS, NOT INTRACTABLE: Primary | ICD-10-CM

## 2022-05-03 RX ORDER — METHYLPREDNISOLONE 4 MG/1
TABLET ORAL
Qty: 21 EACH | Refills: 0 | Status: SHIPPED | OUTPATIENT
Start: 2022-05-03 | End: 2022-05-24

## 2022-07-18 ENCOUNTER — PATIENT MESSAGE (OUTPATIENT)
Dept: NEUROLOGY | Facility: CLINIC | Age: 37
End: 2022-07-18
Payer: COMMERCIAL

## 2022-10-13 ENCOUNTER — OFFICE VISIT (OUTPATIENT)
Dept: NEUROLOGY | Facility: CLINIC | Age: 37
End: 2022-10-13
Payer: COMMERCIAL

## 2022-10-13 VITALS
HEART RATE: 86 BPM | DIASTOLIC BLOOD PRESSURE: 72 MMHG | WEIGHT: 199.75 LBS | RESPIRATION RATE: 14 BRPM | BODY MASS INDEX: 37.71 KG/M2 | SYSTOLIC BLOOD PRESSURE: 102 MMHG | HEIGHT: 61 IN

## 2022-10-13 DIAGNOSIS — E66.9 OBESITY (BMI 30-39.9): ICD-10-CM

## 2022-10-13 DIAGNOSIS — G43.101 MIGRAINE WITH AURA AND WITH STATUS MIGRAINOSUS, NOT INTRACTABLE: Primary | ICD-10-CM

## 2022-10-13 DIAGNOSIS — G93.2 PSEUDOTUMOR CEREBRI: ICD-10-CM

## 2022-10-13 PROCEDURE — 99999 PR PBB SHADOW E&M-EST. PATIENT-LVL IV: ICD-10-PCS | Mod: PBBFAC,,, | Performed by: PSYCHIATRY & NEUROLOGY

## 2022-10-13 PROCEDURE — 1159F MED LIST DOCD IN RCRD: CPT | Mod: CPTII,S$GLB,, | Performed by: PSYCHIATRY & NEUROLOGY

## 2022-10-13 PROCEDURE — 3074F PR MOST RECENT SYSTOLIC BLOOD PRESSURE < 130 MM HG: ICD-10-PCS | Mod: CPTII,S$GLB,, | Performed by: PSYCHIATRY & NEUROLOGY

## 2022-10-13 PROCEDURE — 3078F PR MOST RECENT DIASTOLIC BLOOD PRESSURE < 80 MM HG: ICD-10-PCS | Mod: CPTII,S$GLB,, | Performed by: PSYCHIATRY & NEUROLOGY

## 2022-10-13 PROCEDURE — 3074F SYST BP LT 130 MM HG: CPT | Mod: CPTII,S$GLB,, | Performed by: PSYCHIATRY & NEUROLOGY

## 2022-10-13 PROCEDURE — 99214 PR OFFICE/OUTPT VISIT, EST, LEVL IV, 30-39 MIN: ICD-10-PCS | Mod: S$GLB,,, | Performed by: PSYCHIATRY & NEUROLOGY

## 2022-10-13 PROCEDURE — 1160F RVW MEDS BY RX/DR IN RCRD: CPT | Mod: CPTII,S$GLB,, | Performed by: PSYCHIATRY & NEUROLOGY

## 2022-10-13 PROCEDURE — 1159F PR MEDICATION LIST DOCUMENTED IN MEDICAL RECORD: ICD-10-PCS | Mod: CPTII,S$GLB,, | Performed by: PSYCHIATRY & NEUROLOGY

## 2022-10-13 PROCEDURE — 1160F PR REVIEW ALL MEDS BY PRESCRIBER/CLIN PHARMACIST DOCUMENTED: ICD-10-PCS | Mod: CPTII,S$GLB,, | Performed by: PSYCHIATRY & NEUROLOGY

## 2022-10-13 PROCEDURE — 99999 PR PBB SHADOW E&M-EST. PATIENT-LVL IV: CPT | Mod: PBBFAC,,, | Performed by: PSYCHIATRY & NEUROLOGY

## 2022-10-13 PROCEDURE — 3078F DIAST BP <80 MM HG: CPT | Mod: CPTII,S$GLB,, | Performed by: PSYCHIATRY & NEUROLOGY

## 2022-10-13 PROCEDURE — 99214 OFFICE O/P EST MOD 30 MIN: CPT | Mod: S$GLB,,, | Performed by: PSYCHIATRY & NEUROLOGY

## 2022-10-13 NOTE — PROGRESS NOTES
HPI:  Dina Duran is a 37 y.o. female  with pseudotumor cerebri diagnosed in 7/2016 after visual exam (due to blurred vision) and LP results.     Patient is here for follow up      She could not tolerate lower dose Diamox due to breakthrough headaches    She went back to prior dose as there was no change in dizziness.    She has not been dizzy in months actually    Eye exams have been good      She called in 5/2022 with 6 days of headache/ migraine type and medrol pack was called in then. This helped after  few days.     The headache was associated with dizziness and bilateral tingling    She does sometimes see black dots in the right peripheral field. This is episodic and not associated with a headache.     She has had aura with migraine prior and this was similar    Gyn told her she is possibly premenopausal.    Imitrex never tried to date.     Weight is 2 pounds    Review of Systems   Constitutional:  Negative for fever.   HENT:  Negative for nosebleeds and tinnitus.    Eyes:  Negative for blurred vision.   Respiratory:  Negative for hemoptysis.    Cardiovascular:  Negative for leg swelling.   Gastrointestinal:  Negative for blood in stool.   Genitourinary:  Negative for hematuria.   Musculoskeletal:  Negative for falls.   Skin:  Negative for rash.   Neurological:  Negative for dizziness and headaches.   Endo/Heme/Allergies:  Does not bruise/bleed easily.       I have reviewed all of this patient's past medical and surgical histories as well as family and social histories and active allergies and medications as documented in the electronic medical record.      Exam:  Gen Appearance, well developed/nourished in no apparent distress  CV: 2+ distal pulses with no edema or swelling  Neuro:  MS: Awake, alert, . Sustains attention. Recent/remote memory intact, Language is full to spontaneous speech/comprehension. Fund of Knowledge is full  CN: Optic discs are normal today with normal vasculature. PERRL,  Extraoccular movements and visual fields are full. Normal facial sensation and strength, Hearing symmetric, Tongue and Palate are midline and strong. Shoulder Shrug symmetric and strong.  Motor: Normal bulk, tone, no abnormal movements. 5/5 strength bilateral upper/lower extremities with 2+ reflexes no clonus  Sensory: symmetric  temp, and vibration. Romberg negative  Cerebellar: Finger-nose, Rapid alternating movements intact  Gait: Normal stance, no ataxia    MRI/A/V brain 2016- unremarkable    1/2019 CT head: Normal CT brain scan without contrast.      Labs: CMP  1/2020 reviewed    2022 CMP unremarkable    Assessment/Plan: Dina Duran is a 37 y.o. female with pseudotumor cerebri diagnosed in 7/2016 after visual exam (due to blurred vision) and LP results.     I recommend:   1. Patient reported right sided pupillary changes at onset of symptoms prior. MRI/MRA and MRV were normal at presentation. Pupillary exam is normal  -she was sent to neurophthalmology  who  found no papilledema in 2019   -This pupillary change may have been more migraine related  -she has continued follow with her local ophthalmologist for surveillance eye exams    2.  No papilledema found today  -She could not tolerate lower dose  Diamox (done to see if this would help dizziness) due to breakthrough headaches  -Previously saw PT for vestibular rehab given her persistent peripheral type dizziness which did help. She can use this again PRN. Audiology placed her on a lower sodium diet as well  - 2019 Repeat head CT for her complaint of  more dizziness with driving and  more pupillary changes with visual changes was normal.    3. Check CMP yearly (due at the next visit)    4. Ananya was presribed prior for headaches but she declined/  she does not desire prevention medication at this time. She has had some jaw pain thought to be  related to her known TMJ (has a mouth piece for this, muscle relaxer)    5.  Reviewed ultimate treatment of  pseudotumor cerebri is weight loss/reduction of obesity-- need for consistent  diet and exercise reviewed again today. Goal weight loss 10 pounds in a year    8. Can use Fioricet PRN vs Indocin PRN headache- currently not using this  -Imitrex PRN severe episodic headaches again reviewed today  -Discussed her migraine aura without headache is common in premenopausal years.      RTC 1 year

## 2023-02-27 ENCOUNTER — PATIENT MESSAGE (OUTPATIENT)
Dept: NEUROLOGY | Facility: CLINIC | Age: 38
End: 2023-02-27
Payer: COMMERCIAL

## 2023-06-19 ENCOUNTER — PATIENT MESSAGE (OUTPATIENT)
Dept: NEUROLOGY | Facility: CLINIC | Age: 38
End: 2023-06-19
Payer: COMMERCIAL

## 2023-07-05 ENCOUNTER — PATIENT MESSAGE (OUTPATIENT)
Dept: NEUROLOGY | Facility: CLINIC | Age: 38
End: 2023-07-05
Payer: COMMERCIAL

## 2023-07-06 ENCOUNTER — PATIENT MESSAGE (OUTPATIENT)
Dept: NEUROLOGY | Facility: CLINIC | Age: 38
End: 2023-07-06
Payer: COMMERCIAL

## 2023-07-18 NOTE — PROGRESS NOTES
HPI:  Dina Duran is a 32 y.o. female with pseudotumor cerebri diagnosed in 7/2016 after visual exam (due to blurred vision) and LP results.    Patient presents today with complaint of worsening headaches and blurred vision. Her headaches occur daily and can last up to 3 days at a time. She does not currently take any abortive agents, due to fear of side effects. Her headaches are associated with blurred vision, which does not occur when she is headache free. She additionally had associated dizziness and nausea. Her headaches are located to the right side of her head/neck. Headaches became worse last month around the time that she began a new teaching position, which has resulted in a great deal of stress to her.     She additionally admits to cognitive complaints.     She is also having numbness to her left great toe each morning upon waking. She denies lumbar complaints, falls, bowel, or bladder issues.     Increasing her Diamox back to bid has not had a positive effect on her headaches or her blurred vision.     Review of Systems   Constitutional: Negative for fever.   HENT: Negative for nosebleeds.    Eyes: Positive for blurred vision. Negative for double vision, photophobia, pain and redness.        But sometimes seeing shadows.    Respiratory: Negative for hemoptysis.    Cardiovascular: Negative for leg swelling.   Gastrointestinal: Negative for blood in stool.   Genitourinary: Negative for hematuria.   Musculoskeletal: Negative for back pain.   Skin: Negative for rash.   Neurological: Positive for dizziness, sensory change and headaches. Negative for focal weakness.   Psychiatric/Behavioral: Positive for memory loss. The patient has insomnia.          I have reviewed all of this patient's past medical and surgical histories as well as family and social histories and active allergies and medications as documented in the electronic medical record.    Exam:  Gen Appearance, well developed/nourished in  "no apparent distress  CV: 2+ distal pulses with no edema or swelling  Neuro:  MS: Awake, alert, . Sustains attention. Recent/remote memory intact, Language is full to spontaneous speech/comprehension. Fund of Knowledge is full  CN: Optic discs are normal  without venous pulsations today, PERRL, Extraoccular movements and visual fields are full. Normal facial sensation and strength, Hearing symmetric, Tongue and Palate are midline and strong. Shoulder Shrug symmetric and strong.  Motor: Normal bulk, tone, no abnormal movements. 5/5 strength bilateral upper/lower extremities with 2+ reflexes no clonus  Sensory: symmetric  temp, and vibration. Romberg negative. No altered sensation to left great toe.   Cerebellar: Finger-nose, Rapid alternating movements intact  Gait: Normal stance, no ataxia    MRI/A/V Brain 2016- unremarkable    Labs: CMP unremarkable/ as expected for diamox use 2016    Assessment/Plan: Dina Duran is a 32 y.o. female with pseudotumor cerebri diagnosed in 7/2016 after visual exam (due to blurred vision) and LP results.     I recommend:   1. Start Zonegran for what appears to be migraine with aura, likely induced by stress, which could also explain her cognitive complaints; however, Zonegran can also have a positive effect on pseudotumor. No optic disc edema seen today. If she fails to respond, consider repeat neuroimaging, given her cognitive changes.   2. Continue Diamox for pseudotumor for now and check a CMP today.   3. Reviewed ultimate treatment of pseudotumor cerebri is weight loss-- she continues to reduce obesity  4. If left great toe numbness becomes chronic or bothersome, I will proceed with lumbar imaging and an EMG BLE.  5. Start prn Indomethacin to abort headaches; advised not to take with Advil; limit use to 2 days per week to prevent medication rebound; advised on GI side effects.     RTC in 3 months    "this note will not be shared with the patient".     " [Normal Growth] : growth [Normal Development] : development [None] : No known medical problems [No Elimination Concerns] : elimination [No Feeding Concerns] : feeding [No Skin Concerns] : skin [Normal Sleep Pattern] : sleep [Family Support] : family support [Encouraging Literacy Activities] : encouraging literacy activities [Playing with Peers] : playing with peers [Promoting Physical Activity] : promoting physical activity [Safety] : safety [No Medications] : ~He/She~ is not on any medications [Parent/Guardian] : parent/guardian [FreeTextEntry1] : \par RAD: \par Continue Flovent HFA 2 puffs with spacer twice a day.\par Use Albuterol HFA 2 puffs with spacer q 4 hours as needed for wheezing, shortness of breath\par Follow up with [facility] for Asthma Management.\par \par HM:\par Continue balanced diet with all food groups. Brush teeth twice a day with toothbrush. Recommend visit to dentist. As per car seat 's guidelines, use foward-facing car seat in back seat of car. Switch to booster seat when child reaches highest weight/height for seat. Child needs to ride in a belt-positioning booster seat until  4 feet 9 inches has been reached and are between 8 and 12 years of age. Put toddler to sleep in own bed. Help toddler to maintain consistent daily routines and sleep schedule. Pre-K discussed. Ensure home is safe. Use consistent, positive discipline. Read aloud to toddler. Limit screen time to no more than 2 hours per day.\par Given lab requisition for cbc and lead; no labs drawn last year\par Return for well child check in 1 year.\par

## 2023-08-14 RX ORDER — ACETAZOLAMIDE 250 MG/1
TABLET ORAL
Qty: 135 TABLET | Refills: 3 | Status: SHIPPED | OUTPATIENT
Start: 2023-08-14

## 2023-08-31 ENCOUNTER — OFFICE VISIT (OUTPATIENT)
Dept: NEUROLOGY | Facility: CLINIC | Age: 38
End: 2023-08-31
Payer: COMMERCIAL

## 2023-08-31 VITALS
RESPIRATION RATE: 16 BRPM | BODY MASS INDEX: 38.13 KG/M2 | HEART RATE: 76 BPM | WEIGHT: 201.94 LBS | HEIGHT: 61 IN | DIASTOLIC BLOOD PRESSURE: 90 MMHG | SYSTOLIC BLOOD PRESSURE: 126 MMHG

## 2023-08-31 DIAGNOSIS — E66.9 OBESITY (BMI 30-39.9): ICD-10-CM

## 2023-08-31 DIAGNOSIS — G43.101 MIGRAINE WITH AURA AND WITH STATUS MIGRAINOSUS, NOT INTRACTABLE: ICD-10-CM

## 2023-08-31 DIAGNOSIS — R76.8 POSITIVE ANA (ANTINUCLEAR ANTIBODY): ICD-10-CM

## 2023-08-31 DIAGNOSIS — G93.2 PSEUDOTUMOR CEREBRI: Primary | ICD-10-CM

## 2023-08-31 PROCEDURE — 1159F PR MEDICATION LIST DOCUMENTED IN MEDICAL RECORD: ICD-10-PCS | Mod: CPTII,S$GLB,, | Performed by: PSYCHIATRY & NEUROLOGY

## 2023-08-31 PROCEDURE — 99999 PR PBB SHADOW E&M-EST. PATIENT-LVL IV: CPT | Mod: PBBFAC,,, | Performed by: PSYCHIATRY & NEUROLOGY

## 2023-08-31 PROCEDURE — 1160F PR REVIEW ALL MEDS BY PRESCRIBER/CLIN PHARMACIST DOCUMENTED: ICD-10-PCS | Mod: CPTII,S$GLB,, | Performed by: PSYCHIATRY & NEUROLOGY

## 2023-08-31 PROCEDURE — 1159F MED LIST DOCD IN RCRD: CPT | Mod: CPTII,S$GLB,, | Performed by: PSYCHIATRY & NEUROLOGY

## 2023-08-31 PROCEDURE — 3074F SYST BP LT 130 MM HG: CPT | Mod: CPTII,S$GLB,, | Performed by: PSYCHIATRY & NEUROLOGY

## 2023-08-31 PROCEDURE — 1160F RVW MEDS BY RX/DR IN RCRD: CPT | Mod: CPTII,S$GLB,, | Performed by: PSYCHIATRY & NEUROLOGY

## 2023-08-31 PROCEDURE — 3080F DIAST BP >= 90 MM HG: CPT | Mod: CPTII,S$GLB,, | Performed by: PSYCHIATRY & NEUROLOGY

## 2023-08-31 PROCEDURE — 3074F PR MOST RECENT SYSTOLIC BLOOD PRESSURE < 130 MM HG: ICD-10-PCS | Mod: CPTII,S$GLB,, | Performed by: PSYCHIATRY & NEUROLOGY

## 2023-08-31 PROCEDURE — 99214 OFFICE O/P EST MOD 30 MIN: CPT | Mod: S$GLB,,, | Performed by: PSYCHIATRY & NEUROLOGY

## 2023-08-31 PROCEDURE — 3008F PR BODY MASS INDEX (BMI) DOCUMENTED: ICD-10-PCS | Mod: CPTII,S$GLB,, | Performed by: PSYCHIATRY & NEUROLOGY

## 2023-08-31 PROCEDURE — 99214 PR OFFICE/OUTPT VISIT, EST, LEVL IV, 30-39 MIN: ICD-10-PCS | Mod: S$GLB,,, | Performed by: PSYCHIATRY & NEUROLOGY

## 2023-08-31 PROCEDURE — 3080F PR MOST RECENT DIASTOLIC BLOOD PRESSURE >= 90 MM HG: ICD-10-PCS | Mod: CPTII,S$GLB,, | Performed by: PSYCHIATRY & NEUROLOGY

## 2023-08-31 PROCEDURE — 3008F BODY MASS INDEX DOCD: CPT | Mod: CPTII,S$GLB,, | Performed by: PSYCHIATRY & NEUROLOGY

## 2023-08-31 PROCEDURE — 99999 PR PBB SHADOW E&M-EST. PATIENT-LVL IV: ICD-10-PCS | Mod: PBBFAC,,, | Performed by: PSYCHIATRY & NEUROLOGY

## 2023-08-31 RX ORDER — VALACYCLOVIR HYDROCHLORIDE 500 MG/1
500 TABLET, FILM COATED ORAL DAILY
COMMUNITY
Start: 2023-08-18

## 2023-08-31 RX ORDER — AZELASTINE 1 MG/ML
SPRAY, METERED NASAL
COMMUNITY
Start: 2023-08-16

## 2023-08-31 NOTE — PROGRESS NOTES
"HPI:  Dina Duran is a 38 y.o. female  with pseudotumor cerebri diagnosed in 7/2016 after visual exam (due to blurred vision) and LP results.     Patient is here for yearly follow up    She messaged in 7/2023 with dizziness    Saw PT as she had similar symptoms of peripheral type vertigo prior      Dizziness is still noted at times/ not much vertigo but off balance at times and PT did help.         Headaches are currently described as right sided sensation which feels like trickle and "zaps" which lasts just a few seconds/ not really painful.  Occurs every few weeks or so and is sudden      Eye exams have been ok    + dry eyes noted    Imitrex never tried to date.     Weight is up 2 opunds    She overall feels poorly but is functioning well. She reports fatigue, body aches but not severe  She was found to have + JANICE with PCP with elevated C3 complement level    Review of Systems   Constitutional:  Negative for fever.   HENT:  Negative for nosebleeds and tinnitus.    Eyes:  Negative for blurred vision.   Respiratory:  Negative for hemoptysis.    Cardiovascular:  Negative for leg swelling.   Gastrointestinal:  Negative for blood in stool.   Genitourinary:  Negative for hematuria.   Musculoskeletal:  Negative for falls.   Skin:  Negative for rash.   Neurological:  Negative for dizziness and headaches.   Endo/Heme/Allergies:  Does not bruise/bleed easily.         I have reviewed all of this patient's past medical and surgical histories as well as family and social histories and active allergies and medications as documented in the electronic medical record.      Exam:  Gen Appearance, well developed/nourished in no apparent distress  CV: 2+ distal pulses with no edema or swelling  Neuro:  MS: Awake, alert, Sustains attention. Recent/remote memory intact, Language is full to spontaneous speech/comprehension. Fund of Knowledge is full  CN: Optic discs are normal today with normal vasculature. PERRL, Extraoccular " movements and visual fields are full. Normal facial sensation and strength, Hearing symmetric, Tongue and Palate are midline and strong. Shoulder Shrug symmetric and strong.  Motor: Normal bulk, tone, no abnormal movements. 5/5 strength bilateral upper/lower extremities with 2+ reflexes no clonus  Sensory: symmetric  temp, and vibration. Romberg negative  Cerebellar: Finger-nose, Rapid alternating movements intact  Gait: Normal stance, no ataxia    MRI/A/V brain 2016- unremarkable    1/2019 CT head: Normal CT brain scan without contrast.      Labs: CMP  1/2020 reviewed    2022 CMP unremarkable    Assessment/Plan: Dina Duran is a 38 y.o. female with pseudotumor cerebri diagnosed in 7/2016 after visual exam (due to blurred vision) and LP results.     I recommend:   1. Patient reported right sided pupillary changes at onset of symptoms prior. MRI/MRA and MRV were normal at presentation. Pupillary exam is normal  -she was sent to neurophthalmology  who  found no papilledema in 2019   -This pupillary change may have been more migraine related  -she has continued follow with her local ophthalmologist for surveillance eye exams    2.  No papilledema found today  -She could not tolerate lower dose  Diamox (done to see if this would help dizziness) due to breakthrough headaches  -Previously saw PT for vestibular rehab given her persistent peripheral type dizziness which did help. She can use this again PRN. Audiology placed her on a lower sodium diet as well  -PT in 2023 helped again/ some residual symptoms  - 2019 Repeat head CT for her complaint of  more dizziness with driving and  more pupillary changes with visual changes was normal.    3. Check CMP yearly due now    4. Zonegran was presribed prior for headaches but she declined/  she does not desire prevention medication at this time. She has had some jaw pain thought to be  related to her known TMJ (has a mouth piece for this, muscle relaxer)    5.  Reviewed  ultimate treatment of pseudotumor cerebri is weight loss/reduction of obesity-- need for consistent  diet and exercise reviewed again today. Goal weight loss 10 pounds in a year reviewed    6. Vague and sudden/brief head pains noted don't require additional treatment/ exam is reassuring/ likely related to pseudotumor or migraine residual symptoms- Notify me if this worsens    7. + JANICE with mildly elevated complement level in light of vague  fatigue/ some aches and pains/ multiple somatic symptoms  -Refer to rheumatology. She also reports difficulty with dry eyes  -also reports lower iron followed by PCP, of note    8. Can use Fioricet PRN vs Indocin PRN headache- currently not using this  -Imitrex PRN severe episodic headaches can be tried as reviewed today      RTC 1 year

## 2023-09-07 ENCOUNTER — PATIENT MESSAGE (OUTPATIENT)
Dept: NEUROLOGY | Facility: CLINIC | Age: 38
End: 2023-09-07
Payer: COMMERCIAL

## 2023-09-11 ENCOUNTER — PATIENT MESSAGE (OUTPATIENT)
Dept: NEUROLOGY | Facility: CLINIC | Age: 38
End: 2023-09-11
Payer: COMMERCIAL

## 2024-05-31 ENCOUNTER — PATIENT MESSAGE (OUTPATIENT)
Dept: NEUROLOGY | Facility: CLINIC | Age: 39
End: 2024-05-31
Payer: COMMERCIAL

## 2024-06-18 ENCOUNTER — OFFICE VISIT (OUTPATIENT)
Dept: NEUROLOGY | Facility: CLINIC | Age: 39
End: 2024-06-18
Payer: COMMERCIAL

## 2024-06-18 ENCOUNTER — LAB VISIT (OUTPATIENT)
Dept: LAB | Facility: HOSPITAL | Age: 39
End: 2024-06-18
Attending: PSYCHIATRY & NEUROLOGY
Payer: COMMERCIAL

## 2024-06-18 VITALS
DIASTOLIC BLOOD PRESSURE: 92 MMHG | BODY MASS INDEX: 39.05 KG/M2 | HEIGHT: 61 IN | HEART RATE: 82 BPM | RESPIRATION RATE: 14 BRPM | WEIGHT: 206.81 LBS | SYSTOLIC BLOOD PRESSURE: 122 MMHG

## 2024-06-18 DIAGNOSIS — E66.9 OBESITY (BMI 30-39.9): ICD-10-CM

## 2024-06-18 DIAGNOSIS — R76.8 POSITIVE ANA (ANTINUCLEAR ANTIBODY): ICD-10-CM

## 2024-06-18 DIAGNOSIS — G93.2 PSEUDOTUMOR CEREBRI: Primary | ICD-10-CM

## 2024-06-18 DIAGNOSIS — G43.101 MIGRAINE WITH AURA AND WITH STATUS MIGRAINOSUS, NOT INTRACTABLE: ICD-10-CM

## 2024-06-18 DIAGNOSIS — G93.2 PSEUDOTUMOR CEREBRI: ICD-10-CM

## 2024-06-18 LAB
ALBUMIN SERPL BCP-MCNC: 3.6 G/DL (ref 3.5–5.2)
ALP SERPL-CCNC: 64 U/L (ref 55–135)
ALT SERPL W/O P-5'-P-CCNC: 16 U/L (ref 10–44)
ANION GAP SERPL CALC-SCNC: 9 MMOL/L (ref 8–16)
AST SERPL-CCNC: 14 U/L (ref 10–40)
BILIRUB SERPL-MCNC: 0.4 MG/DL (ref 0.1–1)
BUN SERPL-MCNC: 18 MG/DL (ref 6–20)
CALCIUM SERPL-MCNC: 8.9 MG/DL (ref 8.7–10.5)
CHLORIDE SERPL-SCNC: 110 MMOL/L (ref 95–110)
CO2 SERPL-SCNC: 20 MMOL/L (ref 23–29)
CREAT SERPL-MCNC: 0.8 MG/DL (ref 0.5–1.4)
EST. GFR  (NO RACE VARIABLE): >60 ML/MIN/1.73 M^2
GLUCOSE SERPL-MCNC: 84 MG/DL (ref 70–110)
POTASSIUM SERPL-SCNC: 3.9 MMOL/L (ref 3.5–5.1)
PROT SERPL-MCNC: 6.7 G/DL (ref 6–8.4)
SODIUM SERPL-SCNC: 139 MMOL/L (ref 136–145)

## 2024-06-18 PROCEDURE — 80053 COMPREHEN METABOLIC PANEL: CPT | Performed by: PSYCHIATRY & NEUROLOGY

## 2024-06-18 PROCEDURE — 1159F MED LIST DOCD IN RCRD: CPT | Mod: CPTII,S$GLB,, | Performed by: PSYCHIATRY & NEUROLOGY

## 2024-06-18 PROCEDURE — 3074F SYST BP LT 130 MM HG: CPT | Mod: CPTII,S$GLB,, | Performed by: PSYCHIATRY & NEUROLOGY

## 2024-06-18 PROCEDURE — 99999 PR PBB SHADOW E&M-EST. PATIENT-LVL IV: CPT | Mod: PBBFAC,,, | Performed by: PSYCHIATRY & NEUROLOGY

## 2024-06-18 PROCEDURE — 3008F BODY MASS INDEX DOCD: CPT | Mod: CPTII,S$GLB,, | Performed by: PSYCHIATRY & NEUROLOGY

## 2024-06-18 PROCEDURE — 99214 OFFICE O/P EST MOD 30 MIN: CPT | Mod: S$GLB,,, | Performed by: PSYCHIATRY & NEUROLOGY

## 2024-06-18 PROCEDURE — 3080F DIAST BP >= 90 MM HG: CPT | Mod: CPTII,S$GLB,, | Performed by: PSYCHIATRY & NEUROLOGY

## 2024-06-18 PROCEDURE — 36415 COLL VENOUS BLD VENIPUNCTURE: CPT | Performed by: PSYCHIATRY & NEUROLOGY

## 2024-06-18 RX ORDER — LANOLIN ALCOHOL/MO/W.PET/CERES
1000 CREAM (GRAM) TOPICAL DAILY
COMMUNITY

## 2024-06-18 RX ORDER — SERTRALINE HYDROCHLORIDE 50 MG/1
50 TABLET, FILM COATED ORAL DAILY
COMMUNITY
Start: 2024-05-30

## 2024-06-18 NOTE — PROGRESS NOTES
HPI:  Dina Duran is a 38 y.o. female  with pseudotumor cerebri diagnosed in 7/2016 after visual exam (due to blurred vision) and LP results.     Patient is here for routine follow up    She messaged in 11/2023 with headache which occurred with being on Vit D large dose per rheumatology    I instructed her to hold the Vit D for a week which did not help    Headache eventually went away    Rheumatology visits are not ongoing / was discharged    + JANICE not thought to be of any known significance.     She is getting iron infusions with hematology for low iron.     Dizziness is episodic. She can't find a cause. She has this last week but this is not as severe as it was prior. Symptoms are brief.     Not sure of her triggers    Tingling noted with Diamox is not worse.     Brief head pains on the right side continues to occur and can be severe briefly. Again, she can't find a trigger. Has not had this in 5 days.     Eye exam is due. Some trouble with near vision is noted.     She started Zoloft with primary care for anxiety with good effect.     Imitrex not needed/ on has menstrual headaches currently     Weight is up a few pounds. Has a new treadmill which she is starting to use to help improve her health      Review of Systems   Constitutional:  Negative for fever.   HENT:  Negative for nosebleeds and tinnitus.    Eyes:  Negative for blurred vision.   Respiratory:  Negative for hemoptysis.    Cardiovascular:  Negative for leg swelling.   Gastrointestinal:  Negative for blood in stool.   Genitourinary:  Negative for hematuria.   Musculoskeletal:  Negative for falls.   Skin:  Negative for rash.   Neurological:  Positive for dizziness and headaches.   Endo/Heme/Allergies:  Does not bruise/bleed easily.         I have reviewed all of this patient's past medical and surgical histories as well as family and social histories and active allergies and medications as documented in the electronic medical  record.      Exam:  Gen Appearance, well developed/nourished in no apparent distress  CV: 2+ distal pulses with no edema or swelling  Neuro:  MS: Awake, alert, Sustains attention. Recent/remote memory intact, Language is full to spontaneous speech/comprehension. Fund of Knowledge is full  CN: Optic discs are normal today with normal vasculature. PERRL, Extraoccular movements and visual fields are full. Normal facial sensation and strength, Hearing symmetric, Tongue and Palate are midline and strong. Shoulder Shrug symmetric and strong.  Motor: Normal bulk, tone, no abnormal movements. 5/5 strength bilateral upper/lower extremities with 2+ reflexes no clonus  Sensory: symmetric  temp, and vibration. Romberg negative  Cerebellar: Finger-nose, Rapid alternating movements intact  Gait: Normal stance, no ataxia    MRI/A/V brain 2016- unremarkable    1/2019 CT head: Normal CT brain scan without contrast.      Labs: CMP  1/2020 reviewed    2022 CMP unremarkable    Assessment/Plan: Dina Duran is a 38 y.o. female with pseudotumor cerebri diagnosed in 7/2016 after visual exam (due to blurred vision) and LP results.     I recommend:   1. Patient reported right sided pupillary changes at onset of symptoms prior. MRI/MRA and MRV were normal at presentation. Pupillary exam is normal  -she was sent to neurophthalmology  who  found no papilledema in 2019   -This pupillary change may have been more migraine related  -she has continued follow with her local ophthalmologist for surveillance eye exams    2.  No papilledema currently  -She could not tolerate lower dose  Diamox (done to see if this would help dizziness) due to breakthrough headaches  -Previously saw PT for vestibular rehab given her persistent peripheral type dizziness which did help. She can use this again PRN. ENT placed her on a lower sodium diet as well  -PT in 2023 helped again/ some residual symptoms  - 2019 Repeat head CT for her complaint of  more  dizziness with driving and  more pupillary changes with visual changes was normal.    3. Check CMP yearly due now    4. Ananya was presribed prior for headaches prior but she declined/  she does not desire prevention medication at this time. She has had some jaw pain thought to be  related to her known TMJ (has a mouth piece for this, muscle relaxer)    5.  Reviewed ultimate treatment of pseudotumor cerebri is weight loss/reduction of obesity-. Goal weight loss reviewed. She is started to exercise    6. Vague and sudden/brief head pains noted don't require additional treatment/ exam is reassuring/ likely related to pseudotumor or migraine residual symptoms- Notify me if this worsens    7. + JANICE with mildly elevated complement level in light of vague  fatigue/ some aches and pains/ multiple somatic symptoms  -Referred to rheumatology/ no concerns over JANICE noted currently   -also reports lower iron followed by hematology, of note    8. Can use Fioricet PRN vs Indocin PRN headache- currently not using this  -Imitrex PRN severe episodic headaches can be tried as reviewed today      RTC 1 year

## 2024-08-05 RX ORDER — ACETAZOLAMIDE 250 MG/1
TABLET ORAL
Qty: 135 TABLET | Refills: 3 | Status: SHIPPED | OUTPATIENT
Start: 2024-08-05

## 2024-08-22 DIAGNOSIS — Z80.3 FH: BREAST CANCER: ICD-10-CM

## 2024-08-22 DIAGNOSIS — Z12.31 VISIT FOR SCREENING MAMMOGRAM: Primary | ICD-10-CM

## 2024-08-26 ENCOUNTER — HOSPITAL ENCOUNTER (OUTPATIENT)
Dept: RADIOLOGY | Facility: HOSPITAL | Age: 39
Discharge: HOME | End: 2024-08-26
Attending: OBSTETRICS & GYNECOLOGY
Payer: COMMERCIAL

## 2024-08-26 DIAGNOSIS — Z12.31 VISIT FOR SCREENING MAMMOGRAM: ICD-10-CM

## 2024-08-26 PROCEDURE — 77063 BREAST TOMOSYNTHESIS BI: CPT | Mod: TC

## 2024-08-27 ENCOUNTER — PATIENT MESSAGE (OUTPATIENT)
Dept: NEUROLOGY | Facility: CLINIC | Age: 39
End: 2024-08-27
Payer: COMMERCIAL

## 2024-09-20 ENCOUNTER — PATIENT MESSAGE (OUTPATIENT)
Dept: NEUROLOGY | Facility: CLINIC | Age: 39
End: 2024-09-20
Payer: COMMERCIAL

## 2024-09-23 RX ORDER — METHYLPREDNISOLONE 4 MG/1
TABLET ORAL
Qty: 21 EACH | Refills: 0 | Status: SHIPPED | OUTPATIENT
Start: 2024-09-23 | End: 2024-10-14

## 2024-10-30 PROCEDURE — 87798 DETECT AGENT NOS DNA AMP: CPT

## 2024-10-30 PROCEDURE — 87661 TRICHOMONAS VAGINALIS AMPLIF: CPT

## 2024-10-30 PROCEDURE — 87801 DETECT AGNT MULT DNA AMPLI: CPT | Performed by: PHYSICIAN ASSISTANT

## 2024-11-07 ENCOUNTER — LAB VISIT (OUTPATIENT)
Dept: LAB | Facility: HOSPITAL | Age: 39
End: 2024-11-07
Attending: NURSE PRACTITIONER
Payer: COMMERCIAL

## 2024-11-07 DIAGNOSIS — E55.9 AVITAMINOSIS D: ICD-10-CM

## 2024-11-07 DIAGNOSIS — D50.9 IRON DEFICIENCY ANEMIA, UNSPECIFIED: Primary | ICD-10-CM

## 2024-11-07 DIAGNOSIS — R19.4 FREQUENT BOWEL MOVEMENTS: ICD-10-CM

## 2024-11-07 LAB
ALBUMIN SERPL BCP-MCNC: 3.3 G/DL (ref 3.5–5.2)
ALP SERPL-CCNC: 58 U/L (ref 55–135)
ALT SERPL W/O P-5'-P-CCNC: 16 U/L (ref 10–44)
ANION GAP SERPL CALC-SCNC: 6 MMOL/L (ref 3–11)
AST SERPL-CCNC: 9 U/L (ref 10–40)
BILIRUB SERPL-MCNC: 0.4 MG/DL (ref 0.1–1)
BUN SERPL-MCNC: 13 MG/DL (ref 6–20)
CALCIUM SERPL-MCNC: 8.4 MG/DL (ref 8.7–10.5)
CHLORIDE SERPL-SCNC: 108 MMOL/L (ref 95–110)
CO2 SERPL-SCNC: 24 MMOL/L (ref 23–29)
CREAT SERPL-MCNC: 1.1 MG/DL (ref 0.5–1.4)
CRP SERPL-MCNC: <0.29 MG/DL (ref 0–0.75)
EST. GFR  (NO RACE VARIABLE): >60 ML/MIN/1.73 M^2
FERRITIN SERPL-MCNC: 5 NG/ML (ref 20–300)
GLUCOSE SERPL-MCNC: 98 MG/DL (ref 70–110)
IRON SATN MFR SERPL: 15 % (ref 20–50)
IRON SERPL-MCNC: 50 UG/DL (ref 30–160)
POTASSIUM SERPL-SCNC: 3.6 MMOL/L (ref 3.5–5.1)
PROT SERPL-MCNC: 6.7 G/DL (ref 6–8.4)
SODIUM SERPL-SCNC: 138 MMOL/L (ref 136–145)
TOTAL IRON BINDING CAPACITY: 336 UG/DL (ref 250–450)

## 2024-11-07 PROCEDURE — 82728 ASSAY OF FERRITIN: CPT | Performed by: NURSE PRACTITIONER

## 2024-11-07 PROCEDURE — 80053 COMPREHEN METABOLIC PANEL: CPT | Performed by: NURSE PRACTITIONER

## 2024-11-07 PROCEDURE — 86140 C-REACTIVE PROTEIN: CPT | Performed by: NURSE PRACTITIONER

## 2024-11-07 PROCEDURE — 83550 IRON BINDING TEST: CPT | Performed by: NURSE PRACTITIONER

## 2024-11-07 PROCEDURE — 36415 COLL VENOUS BLD VENIPUNCTURE: CPT | Performed by: NURSE PRACTITIONER

## 2024-12-16 DIAGNOSIS — D50.9 IRON DEFICIENCY ANEMIA, UNSPECIFIED: Primary | ICD-10-CM

## 2024-12-16 DIAGNOSIS — R10.32 ABDOMINAL PAIN, LEFT LOWER QUADRANT: ICD-10-CM

## 2024-12-16 DIAGNOSIS — R19.4 CHANGE IN BOWEL HABITS: ICD-10-CM

## 2025-03-26 ENCOUNTER — PATIENT MESSAGE (OUTPATIENT)
Dept: NEUROLOGY | Facility: CLINIC | Age: 40
End: 2025-03-26
Payer: COMMERCIAL

## 2025-03-26 DIAGNOSIS — R11.0 NAUSEA: ICD-10-CM

## 2025-03-26 DIAGNOSIS — G43.909 ACUTE MIGRAINE: Primary | ICD-10-CM

## 2025-03-26 RX ORDER — METHYLPREDNISOLONE 4 MG/1
TABLET ORAL
Qty: 21 EACH | Refills: 0 | Status: SHIPPED | OUTPATIENT
Start: 2025-03-26 | End: 2025-04-16

## 2025-03-26 RX ORDER — ONDANSETRON HYDROCHLORIDE 8 MG/1
8 TABLET, FILM COATED ORAL DAILY PRN
Qty: 20 TABLET | Refills: 1 | Status: SHIPPED | OUTPATIENT
Start: 2025-03-26

## 2025-07-08 ENCOUNTER — OFFICE VISIT (OUTPATIENT)
Dept: NEUROLOGY | Facility: CLINIC | Age: 40
End: 2025-07-08
Payer: COMMERCIAL

## 2025-07-08 VITALS
BODY MASS INDEX: 42.04 KG/M2 | HEART RATE: 88 BPM | RESPIRATION RATE: 16 BRPM | OXYGEN SATURATION: 98 % | DIASTOLIC BLOOD PRESSURE: 84 MMHG | WEIGHT: 222.69 LBS | SYSTOLIC BLOOD PRESSURE: 114 MMHG | HEIGHT: 61 IN

## 2025-07-08 DIAGNOSIS — E66.01 MORBID OBESITY: ICD-10-CM

## 2025-07-08 DIAGNOSIS — G93.2 PSEUDOTUMOR CEREBRI: Primary | ICD-10-CM

## 2025-07-08 DIAGNOSIS — R25.9 ABNORMAL INVOLUNTARY MOVEMENT: ICD-10-CM

## 2025-07-08 DIAGNOSIS — E61.1 LOW IRON: ICD-10-CM

## 2025-07-08 PROCEDURE — 3074F SYST BP LT 130 MM HG: CPT | Mod: CPTII,S$GLB,, | Performed by: PSYCHIATRY & NEUROLOGY

## 2025-07-08 PROCEDURE — 99214 OFFICE O/P EST MOD 30 MIN: CPT | Mod: S$GLB,,, | Performed by: PSYCHIATRY & NEUROLOGY

## 2025-07-08 PROCEDURE — 1160F RVW MEDS BY RX/DR IN RCRD: CPT | Mod: CPTII,S$GLB,, | Performed by: PSYCHIATRY & NEUROLOGY

## 2025-07-08 PROCEDURE — 1159F MED LIST DOCD IN RCRD: CPT | Mod: CPTII,S$GLB,, | Performed by: PSYCHIATRY & NEUROLOGY

## 2025-07-08 PROCEDURE — 99999 PR PBB SHADOW E&M-EST. PATIENT-LVL III: CPT | Mod: PBBFAC,,, | Performed by: PSYCHIATRY & NEUROLOGY

## 2025-07-08 PROCEDURE — 3008F BODY MASS INDEX DOCD: CPT | Mod: CPTII,S$GLB,, | Performed by: PSYCHIATRY & NEUROLOGY

## 2025-07-08 PROCEDURE — 3079F DIAST BP 80-89 MM HG: CPT | Mod: CPTII,S$GLB,, | Performed by: PSYCHIATRY & NEUROLOGY

## 2025-07-08 NOTE — PROGRESS NOTES
HPI:  Dina Duran is a 39 y.o. female  with pseudotumor cerebri diagnosed in 7/2016 after visual exam (due to blurred vision) and LP results.     Patient is here for 1 year follow up    Had a medrol branodn in march for 10 days of headaches and that helped.    Headaches overall have been overall more overall over time. Currently, she has at least 3-4 headaches a week.   Has some eye pain on the right as prior    She is wondering if perimenopause could be  trigger.     She has noted some jerking of the arms and legs when at rest    She is on Lysteda for heavy cycles PRN but has not used this in 3 months    Knows of low iron (50s)    She is on a low dose iron pill/ had better relief with infusion prior    Had better headache control on iron infusion prior but her prior insurance refused to pay    Hematology had infused her prior    Dizziness is episodic     Diamox dose is the same    Eye exam ok within this year        She started Zoloft with primary care for anxiety last year/ caused some weight gain        Review of Systems   Constitutional:  Negative for fever.   HENT:  Negative for nosebleeds and tinnitus.    Eyes:  Negative for blurred vision.   Respiratory:  Negative for hemoptysis.    Cardiovascular:  Negative for leg swelling.   Gastrointestinal:  Negative for blood in stool.   Genitourinary:  Negative for hematuria.   Musculoskeletal:  Negative for falls.   Skin:  Negative for rash.   Neurological:  Positive for dizziness and headaches.   Endo/Heme/Allergies:  Does not bruise/bleed easily.         I have reviewed all of this patient's past medical and surgical histories as well as family and social histories and active allergies and medications as documented in the electronic medical record.      Exam:  Gen Appearance, well developed/nourished in no apparent distress  CV: 2+ distal pulses with no edema or swelling  Neuro:  MS: Awake, alert, Sustains attention. Recent/remote memory intact, Language is  full to spontaneous speech/comprehension. Fund of Knowledge is full  CN: Optic discs are normal today with normal vasculature. PERRL, Extraoccular movements and visual fields are full. Normal facial sensation and strength, Hearing symmetric, Tongue and Palate are midline and strong. Shoulder Shrug symmetric and strong.  Motor: Normal bulk, tone, no abnormal movements. 5/5 strength bilateral upper/lower extremities with 2+ reflexes no clonus  Sensory: symmetric  temp, and vibration. Romberg negative  Cerebellar: Finger-nose, Rapid alternating movements intact  Gait: Normal stance, no ataxia    MRI/A/V brain 2016- unremarkable    1/2019 CT head: Normal CT brain scan without contrast.      Labs: CMP  1/2020 reviewed    2022 CMP unremarkable    2024 CMP reviewed    Assessment/Plan: Dina Duran is a 39 y.o. female with pseudotumor cerebri diagnosed in 7/2016 after visual exam (due to blurred vision) and LP results.     I recommend:   1. Patient reported right sided pupillary changes at onset of symptoms prior. MRI/MRA and MRV were normal at presentation. Pupillary exam is normal  -she was sent to neurophthalmology  who  found no papilledema in 2019   -This pupillary change may have been more migraine related  -she has continued follow with her local ophthalmologist for surveillance eye exams. Ok to continue current dose diamox unless eye exam changes.     2.  No papilledema currently  -She could not tolerate lower dose  Diamox (done to see if this would help dizziness) due to breakthrough headaches  -Previously saw PT for vestibular rehab given her persistent peripheral type dizziness which did help. She can use this again PRN. ENT placed her on a lower sodium diet as well  -PT in 2023 helped again/ some residual symptoms  - 2019 Repeat head CT for her complaint of  more dizziness with driving and  more pupillary changes with visual changes was normal.    3. Check CMP yearly    4. Zonegran was presribed prior  for headaches prior but she declined/  she does not desire prevention medication at this time. She has had some jaw pain thought to be  related to her known TMJ (has a mouth piece for this, muscle relaxer)    5.  Reviewed ultimate treatment of pseudotumor cerebri is weight loss/reduction of obesity-. Goal weight loss reviewed.     6. Vague and sudden/brief head pains noted don't require additional treatment/ exam is reassuring/ likely related to pseudotumor or migraine residual symptoms and low iron state- Notify me if this worsens    7. + JANICE with mildly elevated complement level in light of vague  fatigue/ some aches and pains/ multiple somatic symptoms  -Referred to rheumatology/ no concerns over JANICE noted currently   -also reports lower iron followed by hematology, of note and headaches were better controlled with infusions last year. She has abnormal movements with lower iron as well. I advise she reach out to hematology regarding resuming iron infusions. ALso cautioned her about lysata use for heavy menses / can cause myoclonus. Update eye exam if headaches don't improve on Iron infusion    8. Can use Fioricet PRN vs Indocin PRN headache- currently not using this  -Imitrex PRN severe episodic headaches can be tried as reviewed today           She has noted some jerking of the arms and legs    She is on Lysteda for heavy cycles PRN but has not used this in 3 months    Knows of low iron     RTC 1 year

## 2025-07-22 ENCOUNTER — PATIENT MESSAGE (OUTPATIENT)
Dept: NEUROLOGY | Facility: CLINIC | Age: 40
End: 2025-07-22
Payer: COMMERCIAL

## 2025-07-22 DIAGNOSIS — E61.1 LOW IRON: Primary | ICD-10-CM

## 2025-07-23 ENCOUNTER — LAB VISIT (OUTPATIENT)
Dept: LAB | Facility: HOSPITAL | Age: 40
End: 2025-07-23
Attending: PSYCHIATRY & NEUROLOGY
Payer: COMMERCIAL

## 2025-07-23 DIAGNOSIS — E61.1 LOW IRON: ICD-10-CM

## 2025-07-23 LAB
ABSOLUTE EOSINOPHIL (OHS): 0.05 K/UL
ABSOLUTE MONOCYTE (OHS): 0.55 K/UL (ref 0.3–1)
ABSOLUTE NEUTROPHIL COUNT (OHS): 3.13 K/UL (ref 1.8–7.7)
BASOPHILS # BLD AUTO: 0.06 K/UL
BASOPHILS NFR BLD AUTO: 1 %
ERYTHROCYTE [DISTWIDTH] IN BLOOD BY AUTOMATED COUNT: 12.7 % (ref 11.5–14.5)
HCT VFR BLD AUTO: 44.9 % (ref 37–48.5)
HGB BLD-MCNC: 14.4 GM/DL (ref 12–16)
IMM GRANULOCYTES # BLD AUTO: 0.02 K/UL (ref 0–0.04)
IMM GRANULOCYTES NFR BLD AUTO: 0.3 % (ref 0–0.5)
IRON SATN MFR SERPL: 21 % (ref 20–50)
IRON SERPL-MCNC: 59 UG/DL (ref 30–160)
LYMPHOCYTES # BLD AUTO: 2.48 K/UL (ref 1–4.8)
MCH RBC QN AUTO: 31.6 PG (ref 27–31)
MCHC RBC AUTO-ENTMCNC: 32.1 G/DL (ref 32–36)
MCV RBC AUTO: 99 FL (ref 82–98)
NUCLEATED RBC (/100WBC) (OHS): 0 /100 WBC
PLATELET # BLD AUTO: 257 K/UL (ref 150–450)
PMV BLD AUTO: 10.9 FL (ref 9.2–12.9)
RBC # BLD AUTO: 4.55 M/UL (ref 4–5.4)
RELATIVE EOSINOPHIL (OHS): 0.8 %
RELATIVE LYMPHOCYTE (OHS): 39.4 % (ref 18–48)
RELATIVE MONOCYTE (OHS): 8.7 % (ref 4–15)
RELATIVE NEUTROPHIL (OHS): 49.8 % (ref 38–73)
TIBC SERPL-MCNC: 286 UG/DL (ref 250–450)
TRANSFERRIN SERPL-MCNC: 193 MG/DL (ref 200–375)
WBC # BLD AUTO: 6.29 K/UL (ref 3.9–12.7)

## 2025-07-23 PROCEDURE — 36415 COLL VENOUS BLD VENIPUNCTURE: CPT

## 2025-07-23 PROCEDURE — 85025 COMPLETE CBC W/AUTO DIFF WBC: CPT

## 2025-07-23 PROCEDURE — 83540 ASSAY OF IRON: CPT

## 2025-07-24 ENCOUNTER — PATIENT MESSAGE (OUTPATIENT)
Dept: NEUROLOGY | Facility: CLINIC | Age: 40
End: 2025-07-24
Payer: COMMERCIAL

## 2025-07-29 RX ORDER — ACETAZOLAMIDE 250 MG/1
TABLET ORAL
Qty: 135 TABLET | Refills: 3 | Status: SHIPPED | OUTPATIENT
Start: 2025-07-29